# Patient Record
Sex: FEMALE | Race: WHITE | NOT HISPANIC OR LATINO | URBAN - METROPOLITAN AREA
[De-identification: names, ages, dates, MRNs, and addresses within clinical notes are randomized per-mention and may not be internally consistent; named-entity substitution may affect disease eponyms.]

---

## 2018-02-08 ENCOUNTER — OFFICE VISIT - HEALTHEAST (OUTPATIENT)
Dept: FAMILY MEDICINE | Facility: CLINIC | Age: 73
End: 2018-02-08

## 2018-02-08 DIAGNOSIS — Z00.00 ROUTINE GENERAL MEDICAL EXAMINATION AT A HEALTH CARE FACILITY: ICD-10-CM

## 2018-02-08 DIAGNOSIS — Z23 NEED FOR PROPHYLACTIC VACCINATION AND INOCULATION AGAINST INFLUENZA: ICD-10-CM

## 2018-02-08 DIAGNOSIS — M85.80 OSTEOPENIA: ICD-10-CM

## 2018-02-08 DIAGNOSIS — I10 HYPERTENSION: ICD-10-CM

## 2018-02-08 DIAGNOSIS — E11.9 DIABETES (H): ICD-10-CM

## 2018-02-08 DIAGNOSIS — E78.5 HYPERLIPIDEMIA: ICD-10-CM

## 2018-02-08 DIAGNOSIS — Z13.220 ENCOUNTER FOR SCREENING FOR LIPOID DISORDERS: ICD-10-CM

## 2018-02-08 DIAGNOSIS — Z00.01 ENCOUNTER FOR GENERAL ADULT MEDICAL EXAMINATION WITH ABNORMAL FINDINGS: ICD-10-CM

## 2018-02-08 DIAGNOSIS — Z98.890 H/O COLONOSCOPY: ICD-10-CM

## 2018-02-08 DIAGNOSIS — K57.30 DIVERTICULA OF COLON: ICD-10-CM

## 2018-02-08 LAB
ALBUMIN SERPL-MCNC: 4.1 G/DL (ref 3.5–5)
ALP SERPL-CCNC: 106 U/L (ref 45–120)
ALT SERPL W P-5'-P-CCNC: <9 U/L (ref 0–45)
ANION GAP SERPL CALCULATED.3IONS-SCNC: 10 MMOL/L (ref 5–18)
AST SERPL W P-5'-P-CCNC: 12 U/L (ref 0–40)
BILIRUB SERPL-MCNC: 0.7 MG/DL (ref 0–1)
BUN SERPL-MCNC: 11 MG/DL (ref 8–28)
CALCIUM SERPL-MCNC: 9.3 MG/DL (ref 8.5–10.5)
CHLORIDE BLD-SCNC: 99 MMOL/L (ref 98–107)
CHOLEST SERPL-MCNC: 236 MG/DL
CO2 SERPL-SCNC: 28 MMOL/L (ref 22–31)
CREAT SERPL-MCNC: 0.73 MG/DL (ref 0.6–1.1)
CREAT UR-MCNC: 38.8 MG/DL
FASTING STATUS PATIENT QL REPORTED: YES
GFR SERPL CREATININE-BSD FRML MDRD: >60 ML/MIN/1.73M2
GLUCOSE BLD-MCNC: 281 MG/DL (ref 70–125)
HBA1C MFR BLD: 13.1 % (ref 3.5–6)
HDLC SERPL-MCNC: 47 MG/DL
LDLC SERPL CALC-MCNC: 151 MG/DL
MICROALBUMIN UR-MCNC: 3.23 MG/DL (ref 0–1.99)
MICROALBUMIN/CREAT UR: 83.2 MG/G
POTASSIUM BLD-SCNC: 3.6 MMOL/L (ref 3.5–5)
PROT SERPL-MCNC: 8 G/DL (ref 6–8)
SODIUM SERPL-SCNC: 137 MMOL/L (ref 136–145)
TRIGL SERPL-MCNC: 191 MG/DL

## 2018-02-08 RX ORDER — ASPIRIN 81 MG/1
81 TABLET, CHEWABLE ORAL DAILY
Status: SHIPPED | COMMUNITY
Start: 2018-02-08

## 2018-02-08 ASSESSMENT — MIFFLIN-ST. JEOR: SCORE: 1181.12

## 2018-02-27 ENCOUNTER — OFFICE VISIT - HEALTHEAST (OUTPATIENT)
Dept: FAMILY MEDICINE | Facility: CLINIC | Age: 73
End: 2018-02-27

## 2018-02-27 DIAGNOSIS — E78.5 HYPERLIPIDEMIA: ICD-10-CM

## 2018-02-27 DIAGNOSIS — L21.9 SEBORRHEIC DERMATITIS: ICD-10-CM

## 2018-02-27 DIAGNOSIS — I10 HYPERTENSION: ICD-10-CM

## 2018-02-27 DIAGNOSIS — E11.9 DIABETES (H): ICD-10-CM

## 2018-02-27 DIAGNOSIS — R80.9 MICROALBUMINURIA: ICD-10-CM

## 2018-03-14 ENCOUNTER — OFFICE VISIT - HEALTHEAST (OUTPATIENT)
Dept: NURSING | Facility: CLINIC | Age: 73
End: 2018-03-14

## 2018-03-14 DIAGNOSIS — I10 HYPERTENSION: ICD-10-CM

## 2018-03-14 DIAGNOSIS — E11.8 TYPE 2 DIABETES MELLITUS WITH COMPLICATION, WITHOUT LONG-TERM CURRENT USE OF INSULIN (H): ICD-10-CM

## 2018-03-14 DIAGNOSIS — E78.5 HYPERLIPIDEMIA: ICD-10-CM

## 2018-04-13 ENCOUNTER — OFFICE VISIT - HEALTHEAST (OUTPATIENT)
Dept: NURSING | Facility: CLINIC | Age: 73
End: 2018-04-13

## 2018-04-13 ENCOUNTER — COMMUNICATION - HEALTHEAST (OUTPATIENT)
Dept: NURSING | Facility: CLINIC | Age: 73
End: 2018-04-13

## 2018-04-13 DIAGNOSIS — I10 HYPERTENSION: ICD-10-CM

## 2018-04-13 DIAGNOSIS — E11.8 TYPE 2 DIABETES MELLITUS WITH COMPLICATION, WITHOUT LONG-TERM CURRENT USE OF INSULIN (H): ICD-10-CM

## 2018-04-13 LAB
CREAT SERPL-MCNC: 0.68 MG/DL (ref 0.6–1.1)
GFR SERPL CREATININE-BSD FRML MDRD: >60 ML/MIN/1.73M2
POTASSIUM BLD-SCNC: 3.9 MMOL/L (ref 3.5–5)

## 2018-05-10 ENCOUNTER — OFFICE VISIT - HEALTHEAST (OUTPATIENT)
Dept: FAMILY MEDICINE | Facility: CLINIC | Age: 73
End: 2018-05-10

## 2018-05-10 DIAGNOSIS — R80.9 MICROALBUMINURIA: ICD-10-CM

## 2018-05-10 DIAGNOSIS — E78.5 HYPERLIPIDEMIA: ICD-10-CM

## 2018-05-10 DIAGNOSIS — E11.9 DIABETES MELLITUS (H): ICD-10-CM

## 2018-05-10 DIAGNOSIS — L21.9 SEBORRHEIC DERMATITIS OF SCALP: ICD-10-CM

## 2018-05-10 DIAGNOSIS — I10 HYPERTENSION: ICD-10-CM

## 2018-05-10 LAB — HBA1C MFR BLD: 9.3 % (ref 3.5–6)

## 2018-05-10 ASSESSMENT — MIFFLIN-ST. JEOR: SCORE: 1223.64

## 2018-05-11 ENCOUNTER — OFFICE VISIT - HEALTHEAST (OUTPATIENT)
Dept: EDUCATION SERVICES | Facility: CLINIC | Age: 73
End: 2018-05-11

## 2018-05-11 DIAGNOSIS — E11.8 TYPE 2 DIABETES MELLITUS WITH COMPLICATION, WITHOUT LONG-TERM CURRENT USE OF INSULIN (H): ICD-10-CM

## 2018-05-14 ENCOUNTER — COMMUNICATION - HEALTHEAST (OUTPATIENT)
Dept: FAMILY MEDICINE | Facility: CLINIC | Age: 73
End: 2018-05-14

## 2018-05-15 ENCOUNTER — RECORDS - HEALTHEAST (OUTPATIENT)
Dept: ADMINISTRATIVE | Facility: OTHER | Age: 73
End: 2018-05-15

## 2018-06-05 ENCOUNTER — COMMUNICATION - HEALTHEAST (OUTPATIENT)
Dept: FAMILY MEDICINE | Facility: CLINIC | Age: 73
End: 2018-06-05

## 2018-06-05 DIAGNOSIS — L21.9 SEBORRHEIC DERMATITIS OF SCALP: ICD-10-CM

## 2018-07-19 ENCOUNTER — OFFICE VISIT - HEALTHEAST (OUTPATIENT)
Dept: EDUCATION SERVICES | Facility: CLINIC | Age: 73
End: 2018-07-19

## 2018-07-19 DIAGNOSIS — E08.65 DIABETES MELLITUS DUE TO UNDERLYING CONDITION WITH HYPERGLYCEMIA (H): ICD-10-CM

## 2018-07-19 LAB — HBA1C MFR BLD: 7.5 % (ref 3.5–6)

## 2018-08-21 ENCOUNTER — OFFICE VISIT - HEALTHEAST (OUTPATIENT)
Dept: FAMILY MEDICINE | Facility: CLINIC | Age: 73
End: 2018-08-21

## 2018-08-21 DIAGNOSIS — I10 ESSENTIAL HYPERTENSION: ICD-10-CM

## 2018-08-21 DIAGNOSIS — R80.9 MICROALBUMINURIA: ICD-10-CM

## 2018-08-21 DIAGNOSIS — E78.5 HYPERLIPIDEMIA: ICD-10-CM

## 2018-08-21 DIAGNOSIS — E11.29 TYPE 2 DIABETES MELLITUS WITH MICROALBUMINURIA, WITHOUT LONG-TERM CURRENT USE OF INSULIN (H): ICD-10-CM

## 2018-08-21 DIAGNOSIS — R80.9 TYPE 2 DIABETES MELLITUS WITH MICROALBUMINURIA, WITHOUT LONG-TERM CURRENT USE OF INSULIN (H): ICD-10-CM

## 2018-08-21 LAB
ALT SERPL W P-5'-P-CCNC: <9 U/L (ref 0–45)
AST SERPL W P-5'-P-CCNC: 12 U/L (ref 0–40)
CHOLEST SERPL-MCNC: 117 MG/DL
FASTING STATUS PATIENT QL REPORTED: YES
HBA1C MFR BLD: 7.5 % (ref 3.5–6)
HDLC SERPL-MCNC: 38 MG/DL
LDLC SERPL CALC-MCNC: 55 MG/DL
TRIGL SERPL-MCNC: 118 MG/DL

## 2018-08-22 ENCOUNTER — COMMUNICATION - HEALTHEAST (OUTPATIENT)
Dept: FAMILY MEDICINE | Facility: CLINIC | Age: 73
End: 2018-08-22

## 2018-11-15 ENCOUNTER — OFFICE VISIT - HEALTHEAST (OUTPATIENT)
Dept: FAMILY MEDICINE | Facility: CLINIC | Age: 73
End: 2018-11-15

## 2018-11-15 DIAGNOSIS — R80.9 TYPE 2 DIABETES MELLITUS WITH MICROALBUMINURIA, WITHOUT LONG-TERM CURRENT USE OF INSULIN (H): ICD-10-CM

## 2018-11-15 DIAGNOSIS — Z23 FLU VACCINE NEED: ICD-10-CM

## 2018-11-15 DIAGNOSIS — I10 ESSENTIAL HYPERTENSION: ICD-10-CM

## 2018-11-15 DIAGNOSIS — E78.5 HYPERLIPIDEMIA, UNSPECIFIED HYPERLIPIDEMIA TYPE: ICD-10-CM

## 2018-11-15 DIAGNOSIS — E11.29 TYPE 2 DIABETES MELLITUS WITH MICROALBUMINURIA, WITHOUT LONG-TERM CURRENT USE OF INSULIN (H): ICD-10-CM

## 2018-11-15 DIAGNOSIS — R80.9 MICROALBUMINURIA: ICD-10-CM

## 2018-11-15 DIAGNOSIS — E11.8 TYPE 2 DIABETES MELLITUS WITH COMPLICATION, WITHOUT LONG-TERM CURRENT USE OF INSULIN (H): ICD-10-CM

## 2018-11-15 LAB — HBA1C MFR BLD: 9.3 % (ref 3.5–6)

## 2018-11-24 ENCOUNTER — COMMUNICATION - HEALTHEAST (OUTPATIENT)
Dept: FAMILY MEDICINE | Facility: CLINIC | Age: 73
End: 2018-11-24

## 2018-11-24 DIAGNOSIS — E78.5 HYPERLIPIDEMIA: ICD-10-CM

## 2018-11-24 DIAGNOSIS — E11.8 TYPE 2 DIABETES MELLITUS WITH COMPLICATION, WITHOUT LONG-TERM CURRENT USE OF INSULIN (H): ICD-10-CM

## 2018-11-27 ENCOUNTER — COMMUNICATION - HEALTHEAST (OUTPATIENT)
Dept: FAMILY MEDICINE | Facility: CLINIC | Age: 73
End: 2018-11-27

## 2018-11-27 DIAGNOSIS — E78.5 HYPERLIPIDEMIA: ICD-10-CM

## 2018-11-27 DIAGNOSIS — E11.8 TYPE 2 DIABETES MELLITUS WITH COMPLICATION, WITHOUT LONG-TERM CURRENT USE OF INSULIN (H): ICD-10-CM

## 2019-02-14 ENCOUNTER — OFFICE VISIT - HEALTHEAST (OUTPATIENT)
Dept: FAMILY MEDICINE | Facility: CLINIC | Age: 74
End: 2019-02-14

## 2019-02-14 DIAGNOSIS — Z71.85 IMMUNIZATION COUNSELING: ICD-10-CM

## 2019-02-14 DIAGNOSIS — E11.29 TYPE 2 DIABETES MELLITUS WITH MICROALBUMINURIA, WITHOUT LONG-TERM CURRENT USE OF INSULIN (H): ICD-10-CM

## 2019-02-14 DIAGNOSIS — I10 ESSENTIAL HYPERTENSION: ICD-10-CM

## 2019-02-14 DIAGNOSIS — R80.9 MICROALBUMINURIA: ICD-10-CM

## 2019-02-14 DIAGNOSIS — E78.5 HYPERLIPIDEMIA, UNSPECIFIED HYPERLIPIDEMIA TYPE: ICD-10-CM

## 2019-02-14 DIAGNOSIS — R80.9 TYPE 2 DIABETES MELLITUS WITH MICROALBUMINURIA, WITHOUT LONG-TERM CURRENT USE OF INSULIN (H): ICD-10-CM

## 2019-02-14 LAB
ALBUMIN SERPL-MCNC: 4.1 G/DL (ref 3.5–5)
ALP SERPL-CCNC: 66 U/L (ref 45–120)
ALT SERPL W P-5'-P-CCNC: 9 U/L (ref 0–45)
ANION GAP SERPL CALCULATED.3IONS-SCNC: 10 MMOL/L (ref 5–18)
AST SERPL W P-5'-P-CCNC: 14 U/L (ref 0–40)
BILIRUB SERPL-MCNC: 0.7 MG/DL (ref 0–1)
BUN SERPL-MCNC: 7 MG/DL (ref 8–28)
CALCIUM SERPL-MCNC: 9.1 MG/DL (ref 8.5–10.5)
CHLORIDE BLD-SCNC: 104 MMOL/L (ref 98–107)
CO2 SERPL-SCNC: 28 MMOL/L (ref 22–31)
CREAT SERPL-MCNC: 0.75 MG/DL (ref 0.6–1.1)
GFR SERPL CREATININE-BSD FRML MDRD: >60 ML/MIN/1.73M2
GLUCOSE BLD-MCNC: 107 MG/DL (ref 70–125)
HBA1C MFR BLD: 8.6 % (ref 3.5–6)
POTASSIUM BLD-SCNC: 3.4 MMOL/L (ref 3.5–5)
PROT SERPL-MCNC: 6.9 G/DL (ref 6–8)
SODIUM SERPL-SCNC: 142 MMOL/L (ref 136–145)

## 2019-02-15 ENCOUNTER — COMMUNICATION - HEALTHEAST (OUTPATIENT)
Dept: FAMILY MEDICINE | Facility: CLINIC | Age: 74
End: 2019-02-15

## 2019-05-16 ENCOUNTER — OFFICE VISIT - HEALTHEAST (OUTPATIENT)
Dept: FAMILY MEDICINE | Facility: CLINIC | Age: 74
End: 2019-05-16

## 2019-05-16 DIAGNOSIS — E11.29 TYPE 2 DIABETES MELLITUS WITH MICROALBUMINURIA, WITHOUT LONG-TERM CURRENT USE OF INSULIN (H): ICD-10-CM

## 2019-05-16 DIAGNOSIS — R80.9 MICROALBUMINURIA: ICD-10-CM

## 2019-05-16 DIAGNOSIS — E78.5 HYPERLIPIDEMIA, UNSPECIFIED HYPERLIPIDEMIA TYPE: ICD-10-CM

## 2019-05-16 DIAGNOSIS — R11.0 NAUSEA: ICD-10-CM

## 2019-05-16 DIAGNOSIS — I10 ESSENTIAL HYPERTENSION: ICD-10-CM

## 2019-05-16 DIAGNOSIS — R80.9 TYPE 2 DIABETES MELLITUS WITH MICROALBUMINURIA, WITHOUT LONG-TERM CURRENT USE OF INSULIN (H): ICD-10-CM

## 2019-05-16 LAB
BASOPHILS # BLD AUTO: 0 THOU/UL (ref 0–0.2)
BASOPHILS NFR BLD AUTO: 1 % (ref 0–2)
EOSINOPHIL # BLD AUTO: 0.1 THOU/UL (ref 0–0.4)
EOSINOPHIL NFR BLD AUTO: 2 % (ref 0–6)
ERYTHROCYTE [DISTWIDTH] IN BLOOD BY AUTOMATED COUNT: 11.3 % (ref 11–14.5)
HBA1C MFR BLD: 9.5 % (ref 3.5–6)
HCT VFR BLD AUTO: 42.9 % (ref 35–47)
HGB BLD-MCNC: 14.8 G/DL (ref 12–16)
LYMPHOCYTES # BLD AUTO: 2.5 THOU/UL (ref 0.8–4.4)
LYMPHOCYTES NFR BLD AUTO: 36 % (ref 20–40)
MCH RBC QN AUTO: 30.5 PG (ref 27–34)
MCHC RBC AUTO-ENTMCNC: 34.4 G/DL (ref 32–36)
MCV RBC AUTO: 89 FL (ref 80–100)
MONOCYTES # BLD AUTO: 0.5 THOU/UL (ref 0–0.9)
MONOCYTES NFR BLD AUTO: 7 % (ref 2–10)
NEUTROPHILS # BLD AUTO: 3.8 THOU/UL (ref 2–7.7)
NEUTROPHILS NFR BLD AUTO: 54 % (ref 50–70)
PLATELET # BLD AUTO: 171 THOU/UL (ref 140–440)
PMV BLD AUTO: 7.7 FL (ref 7–10)
RBC # BLD AUTO: 4.84 MILL/UL (ref 3.8–5.4)
WBC: 7 THOU/UL (ref 4–11)

## 2019-05-20 ENCOUNTER — COMMUNICATION - HEALTHEAST (OUTPATIENT)
Dept: FAMILY MEDICINE | Facility: CLINIC | Age: 74
End: 2019-05-20

## 2019-05-23 ENCOUNTER — COMMUNICATION - HEALTHEAST (OUTPATIENT)
Dept: FAMILY MEDICINE | Facility: CLINIC | Age: 74
End: 2019-05-23

## 2019-05-23 DIAGNOSIS — E11.8 TYPE 2 DIABETES MELLITUS WITH COMPLICATION, WITHOUT LONG-TERM CURRENT USE OF INSULIN (H): ICD-10-CM

## 2019-08-17 ENCOUNTER — COMMUNICATION - HEALTHEAST (OUTPATIENT)
Dept: FAMILY MEDICINE | Facility: CLINIC | Age: 74
End: 2019-08-17

## 2019-08-17 DIAGNOSIS — E11.8 TYPE 2 DIABETES MELLITUS WITH COMPLICATION, WITHOUT LONG-TERM CURRENT USE OF INSULIN (H): ICD-10-CM

## 2019-11-12 ENCOUNTER — OFFICE VISIT - HEALTHEAST (OUTPATIENT)
Dept: FAMILY MEDICINE | Facility: CLINIC | Age: 74
End: 2019-11-12

## 2019-11-12 DIAGNOSIS — R80.9 MICROALBUMINURIA: ICD-10-CM

## 2019-11-12 DIAGNOSIS — E78.5 HYPERLIPIDEMIA: ICD-10-CM

## 2019-11-12 DIAGNOSIS — I10 ESSENTIAL HYPERTENSION: ICD-10-CM

## 2019-11-12 DIAGNOSIS — Z23 NEED FOR PROPHYLACTIC VACCINATION AGAINST STREPTOCOCCUS PNEUMONIAE (PNEUMOCOCCUS): ICD-10-CM

## 2019-11-12 DIAGNOSIS — Z23 NEEDS FLU SHOT: ICD-10-CM

## 2019-11-12 DIAGNOSIS — E11.8 TYPE 2 DIABETES MELLITUS WITH COMPLICATION, WITHOUT LONG-TERM CURRENT USE OF INSULIN (H): ICD-10-CM

## 2019-11-12 LAB
CHOLEST SERPL-MCNC: 175 MG/DL
CREAT UR-MCNC: 124.8 MG/DL
FASTING STATUS PATIENT QL REPORTED: YES
HBA1C MFR BLD: 10.5 % (ref 3.5–6)
HDLC SERPL-MCNC: 48 MG/DL
LDLC SERPL CALC-MCNC: 90 MG/DL
MICROALBUMIN UR-MCNC: 4.82 MG/DL (ref 0–1.99)
MICROALBUMIN/CREAT UR: 38.6 MG/G
TRIGL SERPL-MCNC: 186 MG/DL

## 2019-11-12 ASSESSMENT — MIFFLIN-ST. JEOR: SCORE: 1257.66

## 2019-11-14 ENCOUNTER — AMBULATORY - HEALTHEAST (OUTPATIENT)
Dept: FAMILY MEDICINE | Facility: CLINIC | Age: 74
End: 2019-11-14

## 2019-11-14 DIAGNOSIS — E11.8 TYPE 2 DIABETES MELLITUS WITH COMPLICATION, WITHOUT LONG-TERM CURRENT USE OF INSULIN (H): ICD-10-CM

## 2019-11-15 ENCOUNTER — COMMUNICATION - HEALTHEAST (OUTPATIENT)
Dept: FAMILY MEDICINE | Facility: CLINIC | Age: 74
End: 2019-11-15

## 2019-11-15 DIAGNOSIS — E11.8 TYPE 2 DIABETES MELLITUS WITH COMPLICATION, WITHOUT LONG-TERM CURRENT USE OF INSULIN (H): ICD-10-CM

## 2019-12-30 ENCOUNTER — AMBULATORY - HEALTHEAST (OUTPATIENT)
Dept: FAMILY MEDICINE | Facility: CLINIC | Age: 74
End: 2019-12-30

## 2019-12-30 ENCOUNTER — OFFICE VISIT - HEALTHEAST (OUTPATIENT)
Dept: FAMILY MEDICINE | Facility: CLINIC | Age: 74
End: 2019-12-30

## 2019-12-30 DIAGNOSIS — E11.8 TYPE 2 DIABETES MELLITUS WITH COMPLICATION, WITHOUT LONG-TERM CURRENT USE OF INSULIN (H): ICD-10-CM

## 2019-12-30 DIAGNOSIS — E78.5 HYPERLIPIDEMIA, UNSPECIFIED HYPERLIPIDEMIA TYPE: ICD-10-CM

## 2019-12-30 DIAGNOSIS — I10 ESSENTIAL HYPERTENSION: ICD-10-CM

## 2019-12-30 DIAGNOSIS — R80.9 MICROALBUMINURIA: ICD-10-CM

## 2019-12-30 LAB — HBA1C MFR BLD: 10 % (ref 3.5–6)

## 2020-01-04 ENCOUNTER — COMMUNICATION - HEALTHEAST (OUTPATIENT)
Dept: FAMILY MEDICINE | Facility: CLINIC | Age: 75
End: 2020-01-04

## 2020-01-04 DIAGNOSIS — E11.8 TYPE 2 DIABETES MELLITUS WITH COMPLICATION, WITHOUT LONG-TERM CURRENT USE OF INSULIN (H): ICD-10-CM

## 2020-02-17 ENCOUNTER — OFFICE VISIT - HEALTHEAST (OUTPATIENT)
Dept: FAMILY MEDICINE | Facility: CLINIC | Age: 75
End: 2020-02-17

## 2020-02-17 DIAGNOSIS — E78.5 HYPERLIPIDEMIA, UNSPECIFIED HYPERLIPIDEMIA TYPE: ICD-10-CM

## 2020-02-17 DIAGNOSIS — I10 ESSENTIAL HYPERTENSION: ICD-10-CM

## 2020-02-17 DIAGNOSIS — E11.8 TYPE 2 DIABETES MELLITUS WITH COMPLICATION, WITHOUT LONG-TERM CURRENT USE OF INSULIN (H): ICD-10-CM

## 2020-02-17 DIAGNOSIS — R80.9 MICROALBUMINURIA: ICD-10-CM

## 2020-02-17 LAB — HBA1C MFR BLD: 8.7 % (ref 3.5–6)

## 2020-02-18 ENCOUNTER — COMMUNICATION - HEALTHEAST (OUTPATIENT)
Dept: FAMILY MEDICINE | Facility: CLINIC | Age: 75
End: 2020-02-18

## 2020-03-27 ENCOUNTER — COMMUNICATION - HEALTHEAST (OUTPATIENT)
Dept: FAMILY MEDICINE | Facility: CLINIC | Age: 75
End: 2020-03-27

## 2020-03-27 DIAGNOSIS — E78.5 HYPERLIPIDEMIA: ICD-10-CM

## 2020-05-26 ENCOUNTER — OFFICE VISIT - HEALTHEAST (OUTPATIENT)
Dept: FAMILY MEDICINE | Facility: CLINIC | Age: 75
End: 2020-05-26

## 2020-05-26 DIAGNOSIS — E11.8 TYPE 2 DIABETES MELLITUS WITH COMPLICATION, WITHOUT LONG-TERM CURRENT USE OF INSULIN (H): ICD-10-CM

## 2020-05-26 DIAGNOSIS — I10 ESSENTIAL HYPERTENSION: ICD-10-CM

## 2020-05-26 DIAGNOSIS — R80.9 MICROALBUMINURIA: ICD-10-CM

## 2020-05-26 DIAGNOSIS — E78.5 HYPERLIPIDEMIA, UNSPECIFIED HYPERLIPIDEMIA TYPE: ICD-10-CM

## 2020-06-03 ENCOUNTER — COMMUNICATION - HEALTHEAST (OUTPATIENT)
Dept: FAMILY MEDICINE | Facility: CLINIC | Age: 75
End: 2020-06-03

## 2020-06-03 ENCOUNTER — AMBULATORY - HEALTHEAST (OUTPATIENT)
Dept: LAB | Facility: CLINIC | Age: 75
End: 2020-06-03

## 2020-06-03 DIAGNOSIS — E11.8 TYPE 2 DIABETES MELLITUS WITH COMPLICATION, WITHOUT LONG-TERM CURRENT USE OF INSULIN (H): ICD-10-CM

## 2020-06-03 LAB — HBA1C MFR BLD: 7.4 % (ref 3.5–6)

## 2020-06-10 ENCOUNTER — COMMUNICATION - HEALTHEAST (OUTPATIENT)
Dept: FAMILY MEDICINE | Facility: CLINIC | Age: 75
End: 2020-06-10

## 2020-06-10 DIAGNOSIS — E11.8 TYPE 2 DIABETES MELLITUS WITH COMPLICATION, WITHOUT LONG-TERM CURRENT USE OF INSULIN (H): ICD-10-CM

## 2020-07-01 ENCOUNTER — COMMUNICATION - HEALTHEAST (OUTPATIENT)
Dept: FAMILY MEDICINE | Facility: CLINIC | Age: 75
End: 2020-07-01

## 2020-07-01 DIAGNOSIS — E11.8 TYPE 2 DIABETES MELLITUS WITH COMPLICATION, WITHOUT LONG-TERM CURRENT USE OF INSULIN (H): ICD-10-CM

## 2020-07-24 ENCOUNTER — COMMUNICATION - HEALTHEAST (OUTPATIENT)
Dept: FAMILY MEDICINE | Facility: CLINIC | Age: 75
End: 2020-07-24

## 2020-07-24 DIAGNOSIS — R80.9 MICROALBUMINURIA: ICD-10-CM

## 2020-07-24 DIAGNOSIS — I10 ESSENTIAL HYPERTENSION: ICD-10-CM

## 2020-08-03 ENCOUNTER — COMMUNICATION - HEALTHEAST (OUTPATIENT)
Dept: FAMILY MEDICINE | Facility: CLINIC | Age: 75
End: 2020-08-03

## 2020-08-03 DIAGNOSIS — E78.5 HYPERLIPIDEMIA: ICD-10-CM

## 2020-08-14 ENCOUNTER — COMMUNICATION - HEALTHEAST (OUTPATIENT)
Dept: FAMILY MEDICINE | Facility: CLINIC | Age: 75
End: 2020-08-14

## 2020-08-21 ENCOUNTER — COMMUNICATION - HEALTHEAST (OUTPATIENT)
Dept: FAMILY MEDICINE | Facility: CLINIC | Age: 75
End: 2020-08-21

## 2020-08-21 DIAGNOSIS — E11.8 TYPE 2 DIABETES MELLITUS WITH COMPLICATION, WITHOUT LONG-TERM CURRENT USE OF INSULIN (H): ICD-10-CM

## 2020-08-25 ENCOUNTER — OFFICE VISIT - HEALTHEAST (OUTPATIENT)
Dept: FAMILY MEDICINE | Facility: CLINIC | Age: 75
End: 2020-08-25

## 2020-08-25 DIAGNOSIS — Z00.00 ENCOUNTER FOR MEDICARE ANNUAL WELLNESS EXAM: ICD-10-CM

## 2020-08-25 DIAGNOSIS — I10 ESSENTIAL HYPERTENSION: ICD-10-CM

## 2020-08-25 DIAGNOSIS — E78.5 HYPERLIPIDEMIA, UNSPECIFIED HYPERLIPIDEMIA TYPE: ICD-10-CM

## 2020-08-25 DIAGNOSIS — E11.8 TYPE 2 DIABETES MELLITUS WITH COMPLICATION, WITHOUT LONG-TERM CURRENT USE OF INSULIN (H): ICD-10-CM

## 2020-08-25 LAB
ALBUMIN SERPL-MCNC: 4.1 G/DL (ref 3.5–5)
ALP SERPL-CCNC: 90 U/L (ref 45–120)
ALT SERPL W P-5'-P-CCNC: <9 U/L (ref 0–45)
ANION GAP SERPL CALCULATED.3IONS-SCNC: 9 MMOL/L (ref 5–18)
AST SERPL W P-5'-P-CCNC: 11 U/L (ref 0–40)
BILIRUB SERPL-MCNC: 0.3 MG/DL (ref 0–1)
BUN SERPL-MCNC: 11 MG/DL (ref 8–28)
CALCIUM SERPL-MCNC: 9.4 MG/DL (ref 8.5–10.5)
CHLORIDE BLD-SCNC: 104 MMOL/L (ref 98–107)
CHOLEST SERPL-MCNC: 148 MG/DL
CO2 SERPL-SCNC: 26 MMOL/L (ref 22–31)
CREAT SERPL-MCNC: 0.88 MG/DL (ref 0.6–1.1)
FASTING STATUS PATIENT QL REPORTED: ABNORMAL
GFR SERPL CREATININE-BSD FRML MDRD: >60 ML/MIN/1.73M2
GLUCOSE BLD-MCNC: 238 MG/DL (ref 70–125)
HBA1C MFR BLD: 7 %
HDLC SERPL-MCNC: 42 MG/DL
LDLC SERPL CALC-MCNC: 47 MG/DL
POTASSIUM BLD-SCNC: 4.4 MMOL/L (ref 3.5–5)
PROT SERPL-MCNC: 7.3 G/DL (ref 6–8)
SODIUM SERPL-SCNC: 139 MMOL/L (ref 136–145)
TRIGL SERPL-MCNC: 297 MG/DL

## 2020-08-26 ENCOUNTER — COMMUNICATION - HEALTHEAST (OUTPATIENT)
Dept: FAMILY MEDICINE | Facility: CLINIC | Age: 75
End: 2020-08-26

## 2020-08-26 DIAGNOSIS — E11.8 TYPE 2 DIABETES MELLITUS WITH COMPLICATION, WITHOUT LONG-TERM CURRENT USE OF INSULIN (H): ICD-10-CM

## 2020-09-03 ENCOUNTER — AMBULATORY - HEALTHEAST (OUTPATIENT)
Dept: NURSING | Facility: CLINIC | Age: 75
End: 2020-09-03

## 2020-09-03 DIAGNOSIS — Z23 NEED FOR VACCINATION: ICD-10-CM

## 2020-09-08 ENCOUNTER — COMMUNICATION - HEALTHEAST (OUTPATIENT)
Dept: FAMILY MEDICINE | Facility: CLINIC | Age: 75
End: 2020-09-08

## 2020-10-05 ENCOUNTER — COMMUNICATION - HEALTHEAST (OUTPATIENT)
Dept: FAMILY MEDICINE | Facility: CLINIC | Age: 75
End: 2020-10-05

## 2020-10-05 DIAGNOSIS — I10 ESSENTIAL HYPERTENSION: ICD-10-CM

## 2020-10-05 DIAGNOSIS — R80.9 MICROALBUMINURIA: ICD-10-CM

## 2020-10-16 ENCOUNTER — COMMUNICATION - HEALTHEAST (OUTPATIENT)
Dept: FAMILY MEDICINE | Facility: CLINIC | Age: 75
End: 2020-10-16

## 2020-10-16 DIAGNOSIS — E78.5 HYPERLIPIDEMIA: ICD-10-CM

## 2020-10-22 ENCOUNTER — COMMUNICATION - HEALTHEAST (OUTPATIENT)
Dept: FAMILY MEDICINE | Facility: CLINIC | Age: 75
End: 2020-10-22

## 2020-10-22 DIAGNOSIS — E11.8 TYPE 2 DIABETES MELLITUS WITH COMPLICATION, WITHOUT LONG-TERM CURRENT USE OF INSULIN (H): ICD-10-CM

## 2020-11-06 ENCOUNTER — COMMUNICATION - HEALTHEAST (OUTPATIENT)
Dept: FAMILY MEDICINE | Facility: CLINIC | Age: 75
End: 2020-11-06

## 2020-11-06 DIAGNOSIS — E11.8 TYPE 2 DIABETES MELLITUS WITH COMPLICATION, WITHOUT LONG-TERM CURRENT USE OF INSULIN (H): ICD-10-CM

## 2020-12-14 ENCOUNTER — COMMUNICATION - HEALTHEAST (OUTPATIENT)
Dept: FAMILY MEDICINE | Facility: CLINIC | Age: 75
End: 2020-12-14

## 2020-12-14 DIAGNOSIS — R80.9 MICROALBUMINURIA: ICD-10-CM

## 2020-12-14 DIAGNOSIS — I10 ESSENTIAL HYPERTENSION: ICD-10-CM

## 2020-12-28 ENCOUNTER — COMMUNICATION - HEALTHEAST (OUTPATIENT)
Dept: FAMILY MEDICINE | Facility: CLINIC | Age: 75
End: 2020-12-28

## 2020-12-28 DIAGNOSIS — E11.8 TYPE 2 DIABETES MELLITUS WITH COMPLICATION, WITHOUT LONG-TERM CURRENT USE OF INSULIN (H): ICD-10-CM

## 2020-12-28 DIAGNOSIS — E78.5 HYPERLIPIDEMIA: ICD-10-CM

## 2020-12-30 RX ORDER — METFORMIN HCL 500 MG
TABLET, EXTENDED RELEASE 24 HR ORAL
Qty: 360 TABLET | Refills: 0 | Status: SHIPPED | OUTPATIENT
Start: 2020-12-30

## 2021-01-06 ENCOUNTER — OFFICE VISIT - HEALTHEAST (OUTPATIENT)
Dept: FAMILY MEDICINE | Facility: CLINIC | Age: 76
End: 2021-01-06

## 2021-01-06 DIAGNOSIS — Z71.85 IMMUNIZATION COUNSELING: ICD-10-CM

## 2021-01-06 DIAGNOSIS — E78.5 HYPERLIPIDEMIA, UNSPECIFIED HYPERLIPIDEMIA TYPE: ICD-10-CM

## 2021-01-06 DIAGNOSIS — E11.8 TYPE 2 DIABETES MELLITUS WITH COMPLICATION, WITHOUT LONG-TERM CURRENT USE OF INSULIN (H): ICD-10-CM

## 2021-01-06 DIAGNOSIS — R80.9 MICROALBUMINURIA: ICD-10-CM

## 2021-01-06 DIAGNOSIS — I10 ESSENTIAL HYPERTENSION: ICD-10-CM

## 2021-01-07 ENCOUNTER — COMMUNICATION - HEALTHEAST (OUTPATIENT)
Dept: FAMILY MEDICINE | Facility: CLINIC | Age: 76
End: 2021-01-07

## 2021-01-07 DIAGNOSIS — E11.8 TYPE 2 DIABETES MELLITUS WITH COMPLICATION, WITHOUT LONG-TERM CURRENT USE OF INSULIN (H): ICD-10-CM

## 2021-01-08 RX ORDER — BLOOD SUGAR DIAGNOSTIC
STRIP MISCELLANEOUS
Qty: 100 STRIP | Refills: 3 | Status: SHIPPED | OUTPATIENT
Start: 2021-01-08

## 2021-01-11 ENCOUNTER — COMMUNICATION - HEALTHEAST (OUTPATIENT)
Dept: FAMILY MEDICINE | Facility: CLINIC | Age: 76
End: 2021-01-11

## 2021-01-13 ENCOUNTER — COMMUNICATION - HEALTHEAST (OUTPATIENT)
Dept: FAMILY MEDICINE | Facility: CLINIC | Age: 76
End: 2021-01-13

## 2021-01-13 DIAGNOSIS — E11.8 TYPE 2 DIABETES MELLITUS WITH COMPLICATION, WITHOUT LONG-TERM CURRENT USE OF INSULIN (H): ICD-10-CM

## 2021-01-15 ENCOUNTER — COMMUNICATION - HEALTHEAST (OUTPATIENT)
Dept: FAMILY MEDICINE | Facility: CLINIC | Age: 76
End: 2021-01-15

## 2021-01-15 DIAGNOSIS — E11.8 TYPE 2 DIABETES MELLITUS WITH COMPLICATION, WITHOUT LONG-TERM CURRENT USE OF INSULIN (H): ICD-10-CM

## 2021-01-15 RX ORDER — CALCIUM CITRATE/VITAMIN D3 200MG-6.25
1 TABLET ORAL PRN
Status: SHIPPED | COMMUNITY
Start: 2021-01-15

## 2021-01-15 RX ORDER — GLUCOSAM/CHON-MSM1/C/MANG/BOSW 500-416.6
TABLET ORAL
Status: SHIPPED | COMMUNITY
Start: 2021-01-11

## 2021-01-15 RX ORDER — BLOOD-GLUCOSE METER
EACH MISCELLANEOUS
Status: SHIPPED | COMMUNITY
Start: 2021-01-15

## 2021-01-21 ENCOUNTER — COMMUNICATION - HEALTHEAST (OUTPATIENT)
Dept: FAMILY MEDICINE | Facility: CLINIC | Age: 76
End: 2021-01-21

## 2021-02-17 ENCOUNTER — COMMUNICATION - HEALTHEAST (OUTPATIENT)
Dept: FAMILY MEDICINE | Facility: CLINIC | Age: 76
End: 2021-02-17

## 2021-02-22 ENCOUNTER — COMMUNICATION - HEALTHEAST (OUTPATIENT)
Dept: FAMILY MEDICINE | Facility: CLINIC | Age: 76
End: 2021-02-22

## 2021-02-22 DIAGNOSIS — R80.9 MICROALBUMINURIA: ICD-10-CM

## 2021-02-22 DIAGNOSIS — I10 ESSENTIAL HYPERTENSION: ICD-10-CM

## 2021-02-23 RX ORDER — LISINOPRIL 20 MG/1
TABLET ORAL
Qty: 90 TABLET | Refills: 1 | Status: SHIPPED | OUTPATIENT
Start: 2021-02-23

## 2021-03-12 ENCOUNTER — COMMUNICATION - HEALTHEAST (OUTPATIENT)
Dept: FAMILY MEDICINE | Facility: CLINIC | Age: 76
End: 2021-03-12

## 2021-03-12 DIAGNOSIS — E11.8 TYPE 2 DIABETES MELLITUS WITH COMPLICATION, WITHOUT LONG-TERM CURRENT USE OF INSULIN (H): ICD-10-CM

## 2021-04-02 ENCOUNTER — COMMUNICATION - HEALTHEAST (OUTPATIENT)
Dept: FAMILY MEDICINE | Facility: CLINIC | Age: 76
End: 2021-04-02

## 2021-04-02 DIAGNOSIS — E78.5 HYPERLIPIDEMIA: ICD-10-CM

## 2021-04-04 RX ORDER — ATORVASTATIN CALCIUM 40 MG/1
TABLET, FILM COATED ORAL
Qty: 90 TABLET | Refills: 3 | Status: SHIPPED | OUTPATIENT
Start: 2021-04-04 | End: 2022-03-25

## 2021-05-20 ENCOUNTER — RECORDS - HEALTHEAST (OUTPATIENT)
Dept: FAMILY MEDICINE | Facility: CLINIC | Age: 76
End: 2021-05-20

## 2021-05-20 DIAGNOSIS — E11.8 TYPE 2 DIABETES MELLITUS WITH COMPLICATION, WITHOUT LONG-TERM CURRENT USE OF INSULIN (H): ICD-10-CM

## 2021-05-20 RX ORDER — PIOGLITAZONEHYDROCHLORIDE 45 MG/1
TABLET ORAL
Qty: 30 TABLET | Refills: 0 | Status: SHIPPED | OUTPATIENT
Start: 2021-05-20

## 2021-05-20 RX ORDER — GLIPIZIDE 5 MG/1
TABLET, FILM COATED, EXTENDED RELEASE ORAL
Qty: 180 TABLET | Refills: 0 | Status: SHIPPED | OUTPATIENT
Start: 2021-05-20

## 2021-05-28 NOTE — TELEPHONE ENCOUNTER
----- Message from Lukas Lee MD sent at 5/19/2019 12:29 PM CDT -----  Please contact this patient, let her know that the A1c is worse and it is up to 9.5    She needs to restart the Metformin and also I put her on a medication called Januvia.  I sent it to her [facility] drug delivery pharmacy.    She should take the generic Januvia once a day in addition to the Metformin and the glipizide.    Recheck in 6 weeks    Call or stop and if any ongoing nausea

## 2021-05-28 NOTE — TELEPHONE ENCOUNTER
Called pt and relayed provider's message. She requested that a letter be sent out with the instructions. Per pt's request, letter has been drafted and will be sent out for mailing.

## 2021-05-28 NOTE — PROGRESS NOTES
Subjective: This patient comes in for follow-up her last A1c was 8.6 prior to that 9.3.  She continues on glipizide 5 mg 2 twice a day as well as metformin 500 mg 2 twice a day.  She feels like that metformin might be causing some symptoms with her stomach she is had some nausea and headache.  I am not sure if it is a viral or from the medication    She will stay off the metformin for 3 to 4 days then restart.    Please see below I did check a CBC with differential as well as A1c    She is on atorvastatin daily and her LDL was 55 back in August we will recheck that down the line    Has not been checking her blood sugars.    Please see below A1c is up to 9.5.  She did have normal renal function in February were going to add Januvia 100 mg daily to her medications    Tobacco status: She  reports that she has never smoked. She has never used smokeless tobacco.    Patient Active Problem List    Diagnosis Date Noted     Microalbuminuria 05/10/2018     Diabetes mellitus (H) 02/08/2018     Hypertension 02/08/2018     Hyperlipidemia 02/08/2018     Diverticula of colon 02/08/2018     H/O colonoscopy 02/08/2018       Current Outpatient Medications   Medication Sig Dispense Refill     aspirin 81 mg chewable tablet Chew 81 mg daily.        atorvastatin (LIPITOR) 40 MG tablet Take 1 tablet (40 mg total) by mouth daily. 90 tablet 1     CINNAMON BARK (CINNAMON ORAL) Take 1 capsule by mouth daily as needed.       fluocinolone (SYNALAR) 0.01 % external solution Rub into affected scalp 3 times a week 60 mL 2     glipiZIDE (GLUCOTROL XL) 5 MG 24 hr tablet 2 po two times a day with meals. 360 tablet 1     lisinopril (PRINIVIL,ZESTRIL) 10 MG tablet Take 1 tablet (10 mg total) by mouth daily. 30 tablet 11     metFORMIN (GLUCOPHAGE-XR) 500 MG 24 hr tablet TAKE 2 TABLETS TWICE DAILY. 360 tablet 1     CALCIUM CARBONATE/VITAMIN D3 (CALCIUM 500 + D, D3, ORAL) Take 1 tablet by mouth daily as needed.       No current facility-administered  medications for this visit.        ROS: 10 point review of systems positive as outlined above otherwise negative    Objective:    /78 (Patient Site: Left Arm, Patient Position: Sitting, Cuff Size: Adult Large)   Pulse 96   Resp 20   Wt 164 lb 12 oz (74.7 kg)   BMI 27.00 kg/m    Body mass index is 27 kg/m .      General appearance no acute distress    HEENT neck negative no adenopathy oropharynx is clear    Lungs are clear no rales or rhonchi heart was regular S1-S2    Abdomen is soft nontender no guarding rebound, no epigastric pain    Lower extremities without edema pulses normal sensation normal    No joint redness warmth or swelling.    A1c up to 9.5    CBC with white count 7000 normal differential    Results for orders placed or performed in visit on 05/16/19   Glycosylated Hemoglobin A1c   Result Value Ref Range    Hemoglobin A1c 9.5 (H) 3.5 - 6.0 %   HM1 (CBC with Diff)   Result Value Ref Range    WBC 7.0 4.0 - 11.0 thou/uL    RBC 4.84 3.80 - 5.40 mill/uL    Hemoglobin 14.8 12.0 - 16.0 g/dL    Hematocrit 42.9 35.0 - 47.0 %    MCV 89 80 - 100 fL    MCH 30.5 27.0 - 34.0 pg    MCHC 34.4 32.0 - 36.0 g/dL    RDW 11.3 11.0 - 14.5 %    Platelets 171 140 - 440 thou/uL    MPV 7.7 7.0 - 10.0 fL    Neutrophils % 54 50 - 70 %    Lymphocytes % 36 20 - 40 %    Monocytes % 7 2 - 10 %    Eosinophils % 2 0 - 6 %    Basophils % 1 0 - 2 %    Neutrophils Absolute 3.8 2.0 - 7.7 thou/uL    Lymphocytes Absolute 2.5 0.8 - 4.4 thou/uL    Monocytes Absolute 0.5 0.0 - 0.9 thou/uL    Eosinophils Absolute 0.1 0.0 - 0.4 thou/uL    Basophils Absolute 0.0 0.0 - 0.2 thou/uL       Assessment:  1. Type 2 diabetes mellitus with microalbuminuria, without long-term current use of insulin (H)  Glycosylated Hemoglobin A1c   2. Essential hypertension     3. Hyperlipidemia, unspecified hyperlipidemia type     4. Microalbuminuria     5. Nausea  HM1(CBC and Differential)    HM1 (CBC with Diff)     Diabetes mellitus please see above regarding  holding metformin for a few days then restarting it stand glipizide add Januvia 100 mg 1 a day recheck in 6 weeks    Continue same lisinopril and atorvastatin for her hypertension and lipids and microalbumin urea    If nausea symptoms persist follow-up  Plan: As outlined above    This transcription uses voice recognition software, which may contain typographical errors.

## 2021-05-29 NOTE — TELEPHONE ENCOUNTER
RN cannot approve Refill Request    RN can NOT refill this medication Protocol failed and NO refill given.       Sravanthi Man, [facility] Triage/Med Refill 5/24/2019    Requested Prescriptions   Pending Prescriptions Disp Refills     glipiZIDE (GLUCOTROL XL) 5 MG 24 hr tablet [Pharmacy Med Name: GLIPIZIDE ER 5 MG Tablet Extended Release 24 Hour] 360 tablet 1     Sig: TAKE 2 TABLETS TWICE DAILY WITH MEALS       Oral Hypoglycemics Refill Protocol Failed - 5/23/2019 10:41 AM        Failed - Microalbumin in last year      Microalbumin, Random Urine   Date Value Ref Range Status   02/08/2018 3.23 (H) 0.00 - 1.99 mg/dL Final                  Passed - Visit with PCP or prescribing provider visit in last 6 months       Last office visit with prescriber/PCP: 5/16/2019 OR same dept: 5/16/2019 Lukas Lee MD OR same specialty: 5/16/2019 Lukas Lee MD Last physical: Visit date not found Last MTM visit: Visit date not found         Next appt within 3 mo: Visit date not found  Next physical within 3 mo: Visit date not found  Prescriber OR PCP: Lukas Lee MD  Last diagnosis associated with med order: 1. Type 2 diabetes mellitus with complication, without long-term current use of insulin (H)  - glipiZIDE (GLUCOTROL XL) 5 MG 24 hr tablet [Pharmacy Med Name: GLIPIZIDE ER 5 MG Tablet Extended Release 24 Hour]; TAKE 2 TABLETS TWICE DAILY WITH MEALS  Dispense: 360 tablet; Refill: 1     If protocol passes may refill for 12 months if within 3 months of last provider visit (or a total of 15 months).           Passed - A1C in last 6 months     Hemoglobin A1c   Date Value Ref Range Status   05/16/2019 9.5 (H) 3.5 - 6.0 % Final               Passed - Blood pressure in last year     BP Readings from Last 1 Encounters:   05/16/19 130/78             Passed - Serum creatinine in last year     Creatinine   Date Value Ref Range Status   02/14/2019 0.75 0.60 - 1.10 mg/dL Final

## 2021-05-31 VITALS — WEIGHT: 160.25 LBS | BODY MASS INDEX: 27.29 KG/M2

## 2021-05-31 VITALS — WEIGHT: 157 LBS | BODY MASS INDEX: 26.8 KG/M2 | HEIGHT: 64 IN

## 2021-05-31 NOTE — TELEPHONE ENCOUNTER
RN cannot approve Refill Request    RN can NOT refill this medication PCP messaged that patient is overdue for Labs. Last office visit: 5/16/2019 Lukas Lee MD Last Physical: Visit date not found Last MTM visit: Visit date not found Last visit same specialty: 5/16/2019 Lukas Lee MD.  Next visit within 3 mo: Visit date not found  Next physical within 3 mo: Visit date not found      Marina Mehta, [facility] Triage/Med Refill 8/17/2019    Requested Prescriptions   Pending Prescriptions Disp Refills     metFORMIN (GLUCOPHAGE-XR) 500 MG 24 hr tablet [Pharmacy Med Name: METFORMIN HYDROCHLORIDE  MG Tablet Extended Release 24 Hour] 360 tablet 1     Sig: TAKE 2 TABLETS TWICE DAILY.       Metformin Refill Protocol Failed - 8/17/2019  8:15 AM        Failed - Microalbumin in last year      Microalbumin, Random Urine   Date Value Ref Range Status   02/08/2018 3.23 (H) 0.00 - 1.99 mg/dL Final                  Passed - Blood pressure in last 12 months     BP Readings from Last 1 Encounters:   05/16/19 130/78             Passed - LFT or AST or ALT in last 12 months     Albumin   Date Value Ref Range Status   02/14/2019 4.1 3.5 - 5.0 g/dL Final     Bilirubin, Total   Date Value Ref Range Status   02/14/2019 0.7 0.0 - 1.0 mg/dL Final     Alkaline Phosphatase   Date Value Ref Range Status   02/14/2019 66 45 - 120 U/L Final     AST   Date Value Ref Range Status   02/14/2019 14 0 - 40 U/L Final     ALT   Date Value Ref Range Status   02/14/2019 9 0 - 45 U/L Final     Protein, Total   Date Value Ref Range Status   02/14/2019 6.9 6.0 - 8.0 g/dL Final                Passed - GFR or Serum Creatinine in last 6 months     GFR MDRD Non Af Amer   Date Value Ref Range Status   02/14/2019 >60 >60 mL/min/1.73m2 Final     GFR MDRD Af Amer   Date Value Ref Range Status   02/14/2019 >60 >60 mL/min/1.73m2 Final             Passed - Visit with PCP or prescribing provider visit in last 6 months or next 3 months     Last  office visit with prescriber/PCP: 5/16/2019 OR same dept: 5/16/2019 Lukas Lee MD OR same specialty: 5/16/2019 Lukas Lee MD Last physical: Visit date not found Last MTM visit: Visit date not found         Next appt within 3 mo: Visit date not found  Next physical within 3 mo: Visit date not found  Prescriber OR PCP: Lukas Lee MD  Last diagnosis associated with med order: 1. Type 2 diabetes mellitus with complication, without long-term current use of insulin (H)  - metFORMIN (GLUCOPHAGE-XR) 500 MG 24 hr tablet [Pharmacy Med Name: METFORMIN HYDROCHLORIDE  MG Tablet Extended Release 24 Hour]; TAKE 2 TABLETS TWICE DAILY.  Dispense: 360 tablet; Refill: 1     If protocol passes may refill for 12 months if within 3 months of last provider visit (or a total of 15 months).           Passed - A1C in last 6 months     Hemoglobin A1c   Date Value Ref Range Status   05/16/2019 9.5 (H) 3.5 - 6.0 % Final

## 2021-06-01 VITALS — BODY MASS INDEX: 26.03 KG/M2 | HEIGHT: 66 IN | WEIGHT: 162 LBS

## 2021-06-01 VITALS — BODY MASS INDEX: 26.55 KG/M2 | WEIGHT: 162 LBS

## 2021-06-01 VITALS — WEIGHT: 163 LBS | BODY MASS INDEX: 26.71 KG/M2

## 2021-06-02 VITALS — WEIGHT: 165 LBS | BODY MASS INDEX: 27.04 KG/M2

## 2021-06-02 VITALS — BODY MASS INDEX: 27.37 KG/M2 | WEIGHT: 167 LBS

## 2021-06-02 VITALS — WEIGHT: 164.75 LBS | BODY MASS INDEX: 27 KG/M2

## 2021-06-03 VITALS
SYSTOLIC BLOOD PRESSURE: 130 MMHG | WEIGHT: 173 LBS | HEART RATE: 80 BPM | HEIGHT: 65 IN | BODY MASS INDEX: 28.82 KG/M2 | DIASTOLIC BLOOD PRESSURE: 72 MMHG

## 2021-06-03 VITALS
HEART RATE: 94 BPM | DIASTOLIC BLOOD PRESSURE: 64 MMHG | SYSTOLIC BLOOD PRESSURE: 138 MMHG | BODY MASS INDEX: 29.57 KG/M2 | OXYGEN SATURATION: 96 % | WEIGHT: 175 LBS

## 2021-06-03 NOTE — TELEPHONE ENCOUNTER
Called and spoke with Stephan Clark , Message was given, Stephan Clark  understood, no further questions.

## 2021-06-03 NOTE — TELEPHONE ENCOUNTER
Called and spoke with patient. Went over 's message from result notes to patient again. She stated she wants to discontinue Januvia because it is too expensive for her $131.00. She wants to know if there is anything else she can take to replace this. Patient was inform Dr. Lee is not in office. She is okay to wait until mon 11/18/2019.     From Dr. Lee's Message on Result notes.   Patient was contacted.  She has been taking Januvia glipizide and metformin max doses    I am adding pioglitazone 30 mg a day she will work harder on her diet    Recheck in 6 weeks    She has not been checking blood sugars I would like her to do that she will think about that    She does not want to go on insulin.

## 2021-06-03 NOTE — TELEPHONE ENCOUNTER
Question following Office Visit    When did you see your provider: 11/12/19    What is your question:     Patient thought PCP was stopping one of her medications at her last visit and adding another.  Patient inquiring about med changes, please call and advise.    Okay to leave a detailed message: Yes

## 2021-06-03 NOTE — PROGRESS NOTES
Subjective: This patient comes in for evaluation this 74-year-old female has diabetes she is on glipizide 5 mg 2 twice a day metformin 500 mg 2 twice a day and Januvia 100 mg a day.  She wondered about changing the Januvia because of cost.  We will check the A1c and discuss that with her.    She has not been checking her blood sugars.    She is also on atorvastatin for lipids and lisinopril for blood pressure    Blood pressure looks good today.    She is due for a flu shot and PPV 23 these were both given.    Denies any additional issues or problems she does have microalbumin urea.    Labs today included lipid A1c and also microalbumin.    Tobacco status: She  reports that she has never smoked. She has never used smokeless tobacco.    Patient Active Problem List    Diagnosis Date Noted     Microalbuminuria 05/10/2018     Diabetes mellitus (H) 02/08/2018     Hypertension 02/08/2018     Hyperlipidemia 02/08/2018     Diverticula of colon 02/08/2018     H/O colonoscopy 02/08/2018       Current Outpatient Medications   Medication Sig Dispense Refill     aspirin 81 mg chewable tablet Chew 81 mg daily.        metFORMIN (GLUCOPHAGE-XR) 500 MG 24 hr tablet TAKE 2 TABLETS TWICE DAILY. 360 tablet 0     sitaGLIPtin (JANUVIA) 100 MG tablet Take 1 tablet (100 mg total) by mouth daily. With or without food 30 tablet 6     atorvastatin (LIPITOR) 40 MG tablet Take 1 tablet (40 mg total) by mouth daily. 90 tablet 1     CALCIUM CARBONATE/VITAMIN D3 (CALCIUM 500 + D, D3, ORAL) Take 1 tablet by mouth daily as needed.       CINNAMON BARK (CINNAMON ORAL) Take 1 capsule by mouth daily as needed.       fluocinolone (SYNALAR) 0.01 % external solution Rub into affected scalp 3 times a week 60 mL 2     glipiZIDE (GLUCOTROL XL) 5 MG 24 hr tablet TAKE 2 TABLETS TWICE DAILY WITH MEALS 360 tablet 1     lisinopril (PRINIVIL,ZESTRIL) 10 MG tablet Take 1 tablet (10 mg total) by mouth daily. 90 tablet 1     No current facility-administered medications  "for this visit.        ROS:   10 point review of systems positive as discussed above otherwise negative, denies any hypoglycemic symptoms.  Objective:    /72 (Patient Site: Left Arm, Patient Position: Sitting, Cuff Size: Adult Regular)   Pulse 80   Ht 5' 4.5\" (1.638 m)   Wt 173 lb (78.5 kg)   BMI 29.24 kg/m    Body mass index is 29.24 kg/m .      General appearance no acute distress    Vital signs are stable weight is stable    HEENT neck is supple oropharynx clear pupils react normally extract movements full she has not been seeing an ophthalmologist we did discuss this she said she think about it.    Last A1c was 9.5.  Back in May please see discussion    Lungs are clear no rales or rhonchi heart was regular S1-S2 rate at 80    Abdomen nontender no guarding or rebound    Lower extremities without edema pulses normal sensation normal.    Skin was normal no rashes.    Lab work A1c microalbumin and lipid pending    Assessment:  1. Type 2 diabetes mellitus with complication, without long-term current use of insulin (H)  Glycosylated Hemoglobin A1c    Microalbumin, Random Urine    glipiZIDE (GLUCOTROL XL) 5 MG 24 hr tablet   2. Hyperlipidemia  atorvastatin (LIPITOR) 40 MG tablet    Lipid Cascade FASTING   3. Essential hypertension  lisinopril (PRINIVIL,ZESTRIL) 10 MG tablet   4. Needs flu shot  Influenza High Dose, Seasonal 65+ yrs   5. Need for prophylactic vaccination against Streptococcus pneumoniae (pneumococcus)  Pneumococcal polysaccharide vaccine 23-valent 3 yo or older, subq/IM   6. Microalbuminuria       Diabetes mellitus await results.  Patient wonders about changing from Januvia because of cost.  She is also on the glipizide metformin.    Hyperlipidemia on atorvastatin 40 mg daily await results    She is had normal liver and kidney function    Hypertension and microalbumin controlled with lisinopril.    Immunization update with flu shot and PPV 23    Plan: As discussed above she will be contacted " with results and discuss follow-up  This transcription uses voice recognition software, which may contain typographical errors.

## 2021-06-03 NOTE — TELEPHONE ENCOUNTER
Okay she can stop the Januvia.  But I would like her to go up to 45 mg on the new medication, pioglitazone.  She can take 1-1/2 tablets of the 30 mg dose that I sent last week. I sent a new prescription for the 45 mg tablet today.  She would take 1 a day on the 45 mg dose.    I still want to see her back for recheck in 6 weeks.  She should stay on the other medications glipizide and metformin as well

## 2021-06-04 VITALS
DIASTOLIC BLOOD PRESSURE: 70 MMHG | SYSTOLIC BLOOD PRESSURE: 138 MMHG | OXYGEN SATURATION: 98 % | HEART RATE: 76 BPM | WEIGHT: 177 LBS | TEMPERATURE: 97.7 F | BODY MASS INDEX: 29.91 KG/M2

## 2021-06-04 VITALS
SYSTOLIC BLOOD PRESSURE: 140 MMHG | DIASTOLIC BLOOD PRESSURE: 69 MMHG | HEART RATE: 92 BPM | RESPIRATION RATE: 22 BRPM | TEMPERATURE: 98.8 F

## 2021-06-04 NOTE — PROGRESS NOTES
Subjective: Patient comes in for follow-up was here on 11/12/2019.  Patient had A1c up to 10.5.    She is no longer on Januvia because of cost but does take an increased dose of pioglitazone at 45 mg a day.  Also is on metformin 500 mg 2 twice a day and glipizide ER 5 mg 2 twice a day.    Patient is on atorvastatin 40 mg daily.    She has been on lisinopril 10 mg a day as well pressure was borderline initially 160/74 then 142/68 and 138/64.    I did increase her from 10 to 20 mg a day on the lisinopril she does have microalbumin.    Denies any problems she is not checking blood sugars.    Patient denies any hypoglycemic symptoms denies any fatigue chest pain shortness of breath bowel or bladder issues.    Tobacco status: She  reports that she has never smoked. She has never used smokeless tobacco.    Patient Active Problem List    Diagnosis Date Noted     Microalbuminuria 05/10/2018     Diabetes mellitus (H) 02/08/2018     Hypertension 02/08/2018     Hyperlipidemia 02/08/2018     Diverticula of colon 02/08/2018     H/O colonoscopy 02/08/2018       Current Outpatient Medications   Medication Sig Dispense Refill     aspirin 81 mg chewable tablet Chew 81 mg daily.        atorvastatin (LIPITOR) 40 MG tablet Take 1 tablet (40 mg total) by mouth daily. 90 tablet 1     glipiZIDE (GLUCOTROL XL) 5 MG 24 hr tablet TAKE 2 TABLETS TWICE DAILY WITH MEALS 360 tablet 1     pioglitazone (ACTOS) 45 MG tablet Take 1 tablet (45 mg total) by mouth daily. 30 tablet 11     CALCIUM CARBONATE/VITAMIN D3 (CALCIUM 500 + D, D3, ORAL) Take 1 tablet by mouth daily as needed.       CINNAMON BARK (CINNAMON ORAL) Take 1 capsule by mouth daily as needed.       fluocinolone (SYNALAR) 0.01 % external solution Rub into affected scalp 3 times a week 60 mL 2     lisinopril (PRINIVIL,ZESTRIL) 20 MG tablet Take 1 tablet (20 mg total) by mouth daily. 90 tablet 1     metFORMIN (GLUCOPHAGE-XR) 500 MG 24 hr tablet Take 2 tablets (1,000 mg total) by mouth 2  (two) times a day. 360 tablet 1     No current facility-administered medications for this visit.        ROS: 10 point review of systems positive as discussed above otherwise negative    Objective:    /64 (Patient Site: Left Arm, Patient Position: Sitting, Cuff Size: Adult Regular)   Pulse 94   Wt 175 lb (79.4 kg)   SpO2 96%   BMI 29.57 kg/m    Body mass index is 29.57 kg/m .      General appearance no acute distress    Recheck blood pressure as outlined 138/64    HEENT neck supple no adenopathy oropharynx is clear pupils react normally, no scleral icterus    Heart rate was in the 80-90 range her heart was regular no murmur    Lungs were clear throughout no rales or rhonchi O2 sat 96%.    Abdomen is soft nontender no guarding or rebound    Extremities without edema normal pulses    Normal sensation through the feet.    Recheck A1c pending today    LDL on 11/12/2019 was at 90        Assessment:  1. Type 2 diabetes mellitus with complication, without long-term current use of insulin (H)  metFORMIN (GLUCOPHAGE-XR) 500 MG 24 hr tablet    Glycosylated Hemoglobin A1c   2. Hyperlipidemia, unspecified hyperlipidemia type     3. Essential hypertension  lisinopril (PRINIVIL,ZESTRIL) 20 MG tablet   4. Microalbuminuria  lisinopril (PRINIVIL,ZESTRIL) 20 MG tablet     Diabetes mellitus await results contact patient.  Previously A1c was at 10.5.  Refill on meds as outlined    Hypertension with microalbuminuria, borderline control on blood pressure, will increase lisinopril from 10 to 20 mg a day    Hyperlipidemia controlled with atorvastatin 40 mg daily    Plan: As outlined above we will contact patient regarding the results    This transcription uses voice recognition software, which may contain typographical errors.

## 2021-06-04 NOTE — TELEPHONE ENCOUNTER
Medication Question or Clarification  Who is calling: Pharmacy: [phone]  What medication are you calling about? (include dose and sig)   glipiZIDE (GLUCOTROL XL) 5 MG 24 hr tablet 540 tablet 1 12/30/2019     Sig: TAKE 3 TABLETS TWICE DAILY WITH MEALS        Who prescribed the medication?: [facility] - [address]    What is your question/concern?:     the current dose exceeds the maximum allowed dose of 20mg/day.  Please clarify dosing/directions.    Pharmacy:   [facility] - [address]    Okay to leave a detailed message?: Yes  Site CMT - Please call the pharmacy to obtain any additional needed information.

## 2021-06-05 NOTE — TELEPHONE ENCOUNTER
Patient Returning Call  Reason for call:  Glipizide ER 5 mt Tab  Information relayed to patient:  The clinic needs to call pharmacy to say this dose is ok as the order is hold.  Please advise.   Patient has additional questions:  Yes  If YES, what are your questions/concerns:  See above  Okay to leave a detailed message?: No

## 2021-06-06 NOTE — PROGRESS NOTES
Subjective: This patient comes in for follow-up.  This is a 74-year-old female with hypertension diabetes and hyperlipidemia.    Patient previously had elevated blood pressure.  She was supposed to increase lisinopril to 20 mg a day she has not taken any of her blood pressure medicine today.  She will restart that    Patient last A1c in November was 10.5 LDL was 90.  And December 30 A1c was 10.0.    We increase glipizide to 5 mg 3 tablets twice a day she stayed on metformin 500 mg 2 twice a day and pioglitazone 45 mg 1 a day.  She did have a prescription for the 30 mg tablets of pioglitazone and may have been taking them in addition by mistake.  It is unclear.    Also atorvastatin 40 mg a day, see above regarding LDL.    I did recheck A1c today.    She feels well denies any problems    Tobacco status: She  reports that she has never smoked. She has never used smokeless tobacco.    Patient Active Problem List    Diagnosis Date Noted     Microalbuminuria 05/10/2018     Type 2 diabetes mellitus with complication, without long-term current use of insulin (H) 02/08/2018     Hypertension 02/08/2018     Hyperlipidemia 02/08/2018     Diverticula of colon 02/08/2018     H/O colonoscopy 02/08/2018       Current Outpatient Medications   Medication Sig Dispense Refill     aspirin 81 mg chewable tablet Chew 81 mg daily.        atorvastatin (LIPITOR) 40 MG tablet Take 1 tablet (40 mg total) by mouth daily. 90 tablet 1     CALCIUM CARBONATE/VITAMIN D3 (CALCIUM 500 + D, D3, ORAL) Take 1 tablet by mouth daily as needed.       CINNAMON BARK (CINNAMON ORAL) Take 1 capsule by mouth daily as needed.       fluocinolone (SYNALAR) 0.01 % external solution Rub into affected scalp 3 times a week 60 mL 2     glipiZIDE (GLUCOTROL XL) 5 MG 24 hr tablet TAKE 3 TABLETS TWICE DAILY WITH MEALS 540 tablet 1     lisinopril (PRINIVIL,ZESTRIL) 20 MG tablet Take 1 tablet (20 mg total) by mouth daily. 90 tablet 1     metFORMIN (GLUCOPHAGE-XR) 500 MG 24  hr tablet Take 2 tablets (1,000 mg total) by mouth 2 (two) times a day. 360 tablet 1     pioglitazone (ACTOS) 45 MG tablet Take 1 tablet (45 mg total) by mouth daily. 30 tablet 11     No current facility-administered medications for this visit.        ROS:   10 point review of systems positive as outlined above otherwise negative    Objective:    /70 (Patient Site: Left Arm, Patient Position: Sitting, Cuff Size: Adult Large)   Pulse 76   Temp 97.7  F (36.5  C) (Oral)   Wt 177 lb (80.3 kg)   SpO2 98%   BMI 29.91 kg/m    Body mass index is 29.91 kg/m .      General appearance no acute distress    Vital signs are stable afebrile    Weight is at 177 BMI 29.9.  I again encouraged her to improve her diet in addition to the medication changes.    Lungs were clear no rales or rhonchi heart was regular rate in the 70s.  Recheck blood pressure 138/70.    A1c today is down to 8.7, down from 10.0    6 weeks ago.    Results for orders placed or performed in visit on 02/17/20   Glycosylated Hemoglobin A1c   Result Value Ref Range    Hemoglobin A1c 8.7 (H) 3.5 - 6.0 %       Assessment:  1. Type 2 diabetes mellitus with complication, without long-term current use of insulin (H)  Glycosylated Hemoglobin A1c   2. Essential hypertension  lisinopril (PRINIVIL,ZESTRIL) 20 MG tablet   3. Hyperlipidemia, unspecified hyperlipidemia type     4. Microalbuminuria  lisinopril (PRINIVIL,ZESTRIL) 20 MG tablet     Diabetes mellitus improving.  Continue on same glipizide metformin and pioglitazone (but just 45 mg daily on the pioglitazone).    Get back on lisinopril for the hypertension microalbumin.    Continue atorvastatin        Plan: Recheck in 3 months, bring in meds    This transcription uses voice recognition software, which may contain typographical errors.

## 2021-06-06 NOTE — TELEPHONE ENCOUNTER
Called and spoke with Stephan. Message was given. No other questions. She understood the message.

## 2021-06-06 NOTE — TELEPHONE ENCOUNTER
----- Message from Lukas Lee MD sent at 2/18/2020  1:10 PM CST -----  Please contact this patient, let her know that the A1c did improve its down to 8.7.    Continue the same medications for diabetes as well as her high blood pressure and cholesterol.    Recheck in 3 months, bring in all medications again.

## 2021-06-08 NOTE — TELEPHONE ENCOUNTER
----- Message from Lukas Lee MD sent at 6/3/2020 10:19 AM CDT -----  Please contact this patient, let her know that the A1c was 7.4, which is much better.    Stay on the same medications and recheck in 3 months

## 2021-06-08 NOTE — TELEPHONE ENCOUNTER
Last visit: 05/26/2020  , Medication refill requested. Please authorize medication if appropriate. Thank you.

## 2021-06-08 NOTE — TELEPHONE ENCOUNTER
Called and spoke with Stephan Clark, Message was given, Stephan Clark understood, no further questions.

## 2021-06-08 NOTE — PROGRESS NOTES
"Stephan Clark is a 75 y.o. female who is being evaluated via a billable telephone visit.      The patient has been notified of following:     \"This telephone visit will be conducted via a call between you and your physician/provider. We have found that certain health care needs can be provided without the need for a physical exam.  This service lets us provide the care you need with a short phone conversation.  If a prescription is necessary we can send it directly to your pharmacy.  If lab work is needed we can place an order for that and you can then stop by our lab to have the test done at a later time.    Telephone visits are billed at different rates depending on your insurance coverage. During this emergency period, for some insurers they may be billed the same as an in-person visit.  Please reach out to your insurance provider with any questions.    If during the course of the call the physician/provider feels a telephone visit is not appropriate, you will not be charged for this service.\"    Patient has given verbal consent to a Telephone visit? Yes    What phone number would you like to be contacted at? [phone]     Additional provider notes:     No covid 19 sx  No chest pain or shortness of breath   No known exposures    D mellitus   Last a1c 8.7 feb 2020    ldl 90  Nov 2019    On piogli metformin and glipizide bs at home still around 200    May need additional meds   Will check a1c lab only    Lasix 20 daily and atorv 40 daily    No new issues    10 pt ros pos as above otherwise neg    Assessment/Plan:  1. Type 2 diabetes mellitus with complication, without long-term current use of insulin (H)     2. Essential hypertension     3. Hyperlipidemia, unspecified hyperlipidemia type     4. Microalbuminuria       D mellitus  Await a1c  ? More oral meds   Refuses insulin    htn stable on lisinopril    hyperlipid stable on atorv    Call w results and plans    Phone call duration:  13 minutes  9:28 to 9:41 " am    Lukas Lee MD

## 2021-06-10 NOTE — TELEPHONE ENCOUNTER
----- Message from Jonathan Forman CMA sent at 6/10/2020  8:17 AM CDT -----      ----- Message -----  From: Keagan Shepard MD  Sent: 6/9/2020   1:11 PM CDT  To: [facility] Family Medicine/Ob Support Pool    Please set up Annual Wellness visit virtual visit.

## 2021-06-10 NOTE — PROGRESS NOTES
"  Assessment and Plan:     1. Encounter for Medicare annual wellness exam     2. Type 2 diabetes mellitus with complication, without long-term current use of insulin (H)  Glycosylated Hemoglobin A1c   3. Essential hypertension  Comprehensive Metabolic Panel   4. Hyperlipidemia, unspecified hyperlipidemia type  Lipid Cascade FASTING     This is a 76 yo female here for AWV/exam:  1.  Type II DM - reports levels are acceptable - isn't going to do more about her diabetes - will check labs  2.  Hypertension - blood pressure is borderline - will check labs - continue medications - encourage compliance  3.  Hyperlipidemia - check labs      The patient's current medical problems were reviewed.    I have had an Advance Directives discussion with the patient.  The following health maintenance schedule was reviewed with the patient and provided in printed form in the after visit summary:   Health Maintenance   Topic Date Due     DIABETIC EYE EXAM  1945     ZOSTER VACCINES (1 of 2) 04/14/1993     DXA SCAN  07/28/2010     INFLUENZA VACCINE RULE BASED (1) 08/01/2020     MICROALBUMIN  11/12/2020     A1C  02/25/2021     MEDICARE ANNUAL WELLNESS VISIT  08/25/2021     BMP  08/25/2021     DIABETIC FOOT EXAM  08/25/2021     LIPID  08/25/2021     FALL RISK ASSESSMENT  08/25/2021     ADVANCE CARE PLANNING  02/08/2023     TD 18+ HE  02/14/2029     PNEUMOCOCCAL IMMUNIZATION 65+ LOW/MEDIUM RISK  Completed     HEPATITIS B VACCINES  Discontinued        Subjective:   Chief Complaint: Stephan Clark is an 75 y.o. female here for an Annual Wellness visit.   HPI:    Diabetes - \"sometimes high\"    Likes fruit, strawberry, blueberries    Lives by self - lives in apartment - will shovel by door in winter; doesn't have yard work  Drives - appetite is good  Doesn't need cane/walker    Eye exam:  Not too long ago - [facility] -   Drinks a lot of milk - doesn't take her calcium/Vitamin D - doesn't want DXA      Review of Systems: Please see " above.  The rest of the review of systems are negative for all systems.    Patient Care Team:  Lukas Lee MD as PCP - General (Family Medicine)  Lukas Lee MD as Assigned PCP     Patient Active Problem List   Diagnosis     Type 2 diabetes mellitus with complication, without long-term current use of insulin (H)     Hypertension     Hyperlipidemia     Diverticula of colon     H/O colonoscopy     Microalbuminuria     History reviewed. No pertinent past medical history.   History reviewed. No pertinent surgical history.   History reviewed. No pertinent family history.   Social History     Socioeconomic History     Marital status:      Spouse name: Not on file     Number of children: Not on file     Years of education: Not on file     Highest education level: Not on file   Occupational History     Not on file   Social Needs     Financial resource strain: Not on file     Food insecurity     Worry: Not on file     Inability: Not on file     Transportation needs     Medical: Not on file     Non-medical: Not on file   Tobacco Use     Smoking status: Never Smoker     Smokeless tobacco: Never Used   Substance and Sexual Activity     Alcohol use: No     Drug use: Not on file     Sexual activity: Not on file   Lifestyle     Physical activity     Days per week: Not on file     Minutes per session: Not on file     Stress: Not on file   Relationships     Social connections     Talks on phone: Not on file     Gets together: Not on file     Attends Faith service: Not on file     Active member of club or organization: Not on file     Attends meetings of clubs or organizations: Not on file     Relationship status: Not on file     Intimate partner violence     Fear of current or ex partner: Not on file     Emotionally abused: Not on file     Physically abused: Not on file     Forced sexual activity: Not on file   Other Topics Concern     Not on file   Social History Narrative     Not on file      Current  Outpatient Medications   Medication Sig Dispense Refill     aspirin 81 mg chewable tablet Chew 81 mg daily.        atorvastatin (LIPITOR) 40 MG tablet TAKE 1 TABLET (40 MG TOTAL) BY MOUTH DAILY. 90 tablet 0     lisinopriL (PRINIVIL,ZESTRIL) 20 MG tablet TAKE 1 TABLET EVERY DAY 90 tablet 0     metFORMIN (GLUCOPHAGE-XR) 500 MG 24 hr tablet TAKE 2 TABLETS TWICE DAILY 360 tablet 1     pioglitazone (ACTOS) 45 MG tablet TAKE 1 TABLET EVERY DAY (REPLACES THE 30MG DOSE) 90 tablet 0     glipiZIDE (GLUCOTROL XL) 5 MG 24 hr tablet TAKE 3 TABLETS TWICE DAILY WITH MEALS 540 tablet 0     No current facility-administered medications for this visit.       Objective:   Vital Signs:   Visit Vitals  /69 (Patient Site: Right Arm, Patient Position: Sitting, Cuff Size: Adult Large)   Pulse 92   Temp 98.8  F (37.1  C)   Resp 22          VisionScreening:   Hearing Screening    125Hz 250Hz 500Hz 1000Hz 2000Hz 3000Hz 4000Hz 6000Hz 8000Hz   Right ear:            Left ear:               Visual Acuity Screening    Right eye Left eye Both eyes   Without correction:      With correction: 10/40 10/40 10/40        PHYSICAL EXAM  EXAM:  /69 (Patient Site: Right Arm, Patient Position: Sitting, Cuff Size: Adult Large)   Pulse 92   Temp 98.8  F (37.1  C)   Resp 22    Gen:  NAD, appears well, well-hydrated  HEENT:  TMs nl, oropharynx benign, nasal mucosa nl, conjunctiva clear  Neck:  Supple, no adenopathy, no thyromegaly, no carotid bruits, no JVD  Lungs:  Clear to auscultation bilaterally  ,Breast exam:  No breast lumps, no skin changes, no nipple discharge, no axillary adenopathy  Cor:  RRR no murmur  Abd:  Soft, nontender, BS+, no masses, no guarding or rebound, no HSM  Extr:  Neg.  Neuro:  No asymmetry  Skin:  Warm/dry        Assessment Results 8/25/2020   Activities of Daily Living No help needed   Instrumental Activities of Daily Living 1 - Full function   Mini Cog Total Score 5     A Mini-Cog score of 0-2 suggests the possibility  of dementia, score of 3-5 suggests no dementia  Fall risk - generally normal  Cognitive risk - negligible according to testing    Identified Health Risks:     She is at risk for lack of exercise and has been provided with information to increase physical activity for the benefit of her well-being.  The patient was counseled and encouraged to consider modifying their diet and eating habits. She was provided with information on recommended healthy diet options.  Information regarding advance directives (living yanez), including where she can download the appropriate form, was provided to the patient via the AVS.       The patient was provided with appropriate referrals to address her memory problem.

## 2021-06-12 NOTE — TELEPHONE ENCOUNTER
Please contact this patient and she is due for a follow-up virtual visit with Dr. Lee in early January, please schedule    Let her know that I did refill her medications

## 2021-06-14 NOTE — TELEPHONE ENCOUNTER
Reason for Call:  Other call back      Detailed comments: cardiac assesment bethanie is calling to see if we received a form for this patient and would like a call back please    Phone Number Patient can be reached at: Other phone number:  [phone]*    Best Time: anytime    Can we leave a detailed message on this number?: Yes    Call taken on 1/21/2021 at 12:30 PM by Liss Calix

## 2021-06-14 NOTE — TELEPHONE ENCOUNTER
Called the pt and checked with the pt if this was something she wanted done and per pt said no. No further questions. Completing task.

## 2021-06-14 NOTE — TELEPHONE ENCOUNTER
Reason for Call:  Other call back      Detailed comments: checking to see if  recieved a requisition for pt to have cardiac risk assessment with [facility]     Phone Number Patient can be reached at: Other phone number:  [phone]    Best Time: anytime    Can we leave a detailed message on this number?: Yes    Call taken on 1/13/2021 at 11:21 AM by Aline López

## 2021-06-14 NOTE — PROGRESS NOTES
Stephan lCark is a 75 y.o. female who is being evaluated via a billable telephone visit.      What phone number would you like to be contacted at? [phone]   How would you like to obtain your AVS? AVS Preference: Mail a copy.  Assessment & Plan     Stephan was seen today for follow-up.    Diagnoses and all orders for this visit:    Type 2 diabetes mellitus with complication, without long-term current use of insulin (H)  -     Glycosylated Hemoglobin A1c; Future  -     Microalbumin, Random Urine; Future  -     Ambulatory referral to [place] - [facility] [place]    Immunization counseling  -     Varicella Zoster, Recombinant Vaccine IM; Future    Essential hypertension    Hyperlipidemia, unspecified hyperlipidemia type    Microalbuminuria      Patient has diabetes seems to be in fairly good control will recheck A1c stay on the glipizide Metformin and pioglitazone.    Patient will get a shingles shot if her insurance covers it order was placed    We will recheck blood pressure stay on lisinopril 20 mg a day consider increasing.  We will also check microalbumin and fasting creasing we will also be a reason to increase her lisinopril from 20 mg.    Hyperlipidemia controlled with atorvastatin 40 mg a day LDL was normal liver test normal.    Work harder on diet with decreasing carbohydrates for the triglyceride elevation.        23 minutes spent on the virtual telephone visit on the date of the encounter doing chart review, history and exam, documentation and further activities as noted above        Return in about 3 months (around 4/6/2021) for Recheck.  Patient be contacted with the lab results  Lukas Lee MD  [facility]     Stephan Clark is 75 y.o. and presents to clinic today for the following health issues   HPI   This patient had a virtual visit, telephone, due to the coronavirus pandemic.    This patient follows up on her diabetes hypertension  hyperlipidemia    She has microalbuminuria    We also discussed immunizations she has had a flu shot but needs her Shingrix shot she will check on insurance.    She is due for an A1c and microalbumin    Blood sugars at home after eating are in the high 100s.  She is on glipizide Metformin and pioglitazone.    She is on atorvastatin for lipids and lisinopril for blood pressure and microalbumin.    Last blood pressure was 140/69 back in August when she had an annual wellness visit.    Labs in August showed A1c 7.0 blood sugar was 238 CMP was normal including normal liver and renal function.    LDL 47 triglyceride 297    She is trying to stay active and watch carbohydrates.    Denies any COVID-19 exposure or symptoms  Review of Systems  10 point review of systems positive as outlined above      Objective       Vitals:  No vitals were obtained today due to virtual visit.    Physical Exam  General: No acute distress    HEENT denies any nasal congestion or cough or sore throat.    Lungs: Nonlabored breathing no wheezing.    Heart: No palpitations or rapid heart rate no chest pressure    Extremities without edema no pain.    Denies any bowel or bladder issues or abdominal pain.

## 2021-06-14 NOTE — TELEPHONE ENCOUNTER
DOD    Received Rx request from [facility]. Pharmacy's message stated that this patient is new to the [facility] delivery and has requested refills for the prescriptions that are pended. Please review and sign is appropriate.

## 2021-06-14 NOTE — TELEPHONE ENCOUNTER
No I do not recall this.    Please check with the patient to see if this is something that she wants done.    Sometimes this is a scam to try to get able to get blood tests and screening labs etc.

## 2021-06-14 NOTE — TELEPHONE ENCOUNTER
Left message for the caller to re-fax form to [phone] as we did not see any faxes that came through for this pt. Will watch out for form.

## 2021-06-15 NOTE — TELEPHONE ENCOUNTER
There have been multiple forms from a company that wants to do genetic testing on the patient because of her hypertension.    Please contact the patient and see if she wants this done.  If she wants this done then I need to talk to her with a virtual visit to review this.    Also find out by calling the number back, is this what they are calling about, is that the genetic testing company that is calling regarding this task.    Let the genetic company know that I am not filling out the form, unless the patient schedules a visit and wants this done, and I feel it is appropriate after I talk to her

## 2021-06-15 NOTE — TELEPHONE ENCOUNTER
RN cannot approve Refill Request    RN can NOT refill this medication PCP messaged that patient is overdue for Labs and Protocol failed and NO refill given. Last office visit: 2/17/2020 Lukas Lee MD Last Physical: Visit date not found Last MTM visit: Visit date not found Last visit same specialty: 2/17/2020 Lukas Lee MD.  Next visit within 3 mo: Visit date not found  Next physical within 3 mo: Visit date not found      Mishel Wood, [facility] Triage/Med Refill 3/12/2021    Requested Prescriptions   Pending Prescriptions Disp Refills     glipiZIDE (GLUCOTROL XL) 5 MG 24 hr tablet [Pharmacy Med Name: GLIPIZIDE ER 5 MG Tablet Extended Release 24 Hour] 540 tablet 0     Sig: TAKE 3 TABLETS TWICE DAILY WITH MEALS       Oral Hypoglycemics Refill Protocol Failed - 3/12/2021  1:21 PM        Failed - A1C in last 6 months     Hemoglobin A1c   Date Value Ref Range Status   08/25/2020 7.0 (H) <=5.6 % Final     Comment:     Normal <5.7% Prediabete 5.7-6.4% Diabletes 6.5% or higher - adopted from ADA consensus guidelines               Failed - Microalbumin in last year      Microalbumin, Random Urine   Date Value Ref Range Status   11/12/2019 4.82 (H) 0.00 - 1.99 mg/dL Final                  Passed - Visit with PCP or prescribing provider visit in last 6 months       Last office visit with prescriber/PCP: Visit date not found OR same dept: Visit date not found OR same specialty: 2/17/2020 Lukas Lee MD Last physical: Visit date not found Last MTM visit: Visit date not found         Next appt within 3 mo: Visit date not found  Next physical within 3 mo: Visit date not found  Prescriber OR PCP: Lukas Lee MD  Last diagnosis associated with med order: 1. Type 2 diabetes mellitus with complication, without long-term current use of insulin (H)  - glipiZIDE (GLUCOTROL XL) 5 MG 24 hr tablet [Pharmacy Med Name: GLIPIZIDE ER 5 MG Tablet Extended Release 24 Hour]; TAKE 3 TABLETS TWICE DAILY WITH MEALS   Dispense: 540 tablet; Refill: 0  - pioglitazone (ACTOS) 45 MG tablet [Pharmacy Med Name: PIOGLITAZONE HCL 45 MG Tablet]; TAKE 1 TABLET EVERY DAY (REPLACES THE 30MG DOSE)  Dispense: 90 tablet; Refill: 0     If protocol passes may refill for 12 months if within 3 months of last provider visit (or a total of 15 months).           Passed - Blood pressure in last year     BP Readings from Last 1 Encounters:   08/25/20 140/69             Passed - Serum creatinine in last year     Creatinine   Date Value Ref Range Status   08/25/2020 0.88 0.60 - 1.10 mg/dL Final                pioglitazone (ACTOS) 45 MG tablet [Pharmacy Med Name: PIOGLITAZONE HCL 45 MG Tablet] 90 tablet 0     Sig: TAKE 1 TABLET EVERY DAY (REPLACES THE 30MG DOSE)       Pioglitazone Protocol Failed - 3/12/2021  1:21 PM        Failed - A1C in last 6 months     Hemoglobin A1c   Date Value Ref Range Status   08/25/2020 7.0 (H) <=5.6 % Final     Comment:     Normal <5.7% Prediabete 5.7-6.4% Diabletes 6.5% or higher - adopted from ADA consensus guidelines               Failed - Microalbumin in last year     Microalbumin, Random Urine   Date Value Ref Range Status   11/12/2019 4.82 (H) 0.00 - 1.99 mg/dL Final                  Passed - Blood pressure in last 12 months     BP Readings from Last 1 Encounters:   08/25/20 140/69             Passed - LFT or AST or ALT in last 12 months     Albumin   Date Value Ref Range Status   08/25/2020 4.1 3.5 - 5.0 g/dL Final     Bilirubin, Total   Date Value Ref Range Status   08/25/2020 0.3 0.0 - 1.0 mg/dL Final     Alkaline Phosphatase   Date Value Ref Range Status   08/25/2020 90 45 - 120 U/L Final     AST   Date Value Ref Range Status   08/25/2020 11 0 - 40 U/L Final     ALT   Date Value Ref Range Status   08/25/2020 <9 0 - 45 U/L Final     Protein, Total   Date Value Ref Range Status   08/25/2020 7.3 6.0 - 8.0 g/dL Final                Passed - Visit with PCP or prescribing provider visit in last 6 months      Last office  visit with prescriber/PCP: Visit date not found OR same dept: Visit date not found OR same specialty: 2/17/2020 Lukas Lee MD Last physical: Visit date not found Last MTM visit: Visit date not found         Next appt within 3 mo: Visit date not found  Next physical within 3 mo: Visit date not found  Prescriber OR PCP: Lukas Lee MD  Last diagnosis associated with med order: 1. Type 2 diabetes mellitus with complication, without long-term current use of insulin (H)  - glipiZIDE (GLUCOTROL XL) 5 MG 24 hr tablet [Pharmacy Med Name: GLIPIZIDE ER 5 MG Tablet Extended Release 24 Hour]; TAKE 3 TABLETS TWICE DAILY WITH MEALS  Dispense: 540 tablet; Refill: 0  - pioglitazone (ACTOS) 45 MG tablet [Pharmacy Med Name: PIOGLITAZONE HCL 45 MG Tablet]; TAKE 1 TABLET EVERY DAY (REPLACES THE 30MG DOSE)  Dispense: 90 tablet; Refill: 0     If protocol passes may refill for 12 months if within 3 months of last provider visit (or a total of 15 months).           Passed - Serum creatinine in last year     Creatinine   Date Value Ref Range Status   08/25/2020 0.88 0.60 - 1.10 mg/dL Final                    No

## 2021-06-15 NOTE — PROGRESS NOTES
Assessment and Plan:     1. Routine general medical examination at a health care facility  2. Diabetes  3. Hypertension  4. Hyperlipidemia  - Lipid Cascade, FASTING  - Glycosylated Hemoglobin A1C  - Comprehensive Metabolic Panel  - Microalbumin, Random Urine  5. Diverticula of colon  6. H/O colonoscopy  7. Osteopenia  8. Need for prophylactic vaccination and inoculation against influenz  - Influenza High Dose, Seasonal 65+ yrs  9. Encounter for general adult medical examination with abnormal findings  Reviewed EHR.   Discussed prior diagnosis, need for follow up.   Will follow blood work.   Blood pressure uncontrolled.   Question possibility of dementia.   Advised to return in two weeks for blood pressure check and follow up of diabetes.   Continue ASA.   Advised on mammogram, she declines.   Pap smear reviewed, she declines pelvic exam.   Colonoscopy due in 2021.   Defers repeat DEXA.   Agrees to update immunizations, influenza and pneumovax.   Discussed healthy lifestyle modifications, breast exam.     The patient's current medical problems were reviewed.    I have had an Advance Directives discussion with the patient.  The following high BMI interventions were performed this visit: encouragement to exercise and weight monitoring  The following health maintenance schedule was reviewed with the patient and provided in printed form in the after visit summary:   Health Maintenance   Topic Date Due     ZOSTER VACCINE  04/14/2003     PNEUMOCOCCAL CONJUGATE VACCINE FOR ADULTS (PCV13 OR PREVNAR)  04/14/2008     DXA SCAN  07/28/2010     TD 18+ HE  03/08/2016     INFLUENZA VACCINE RULE BASED (1) 08/01/2017     FALL RISK ASSESSMENT  02/08/2019     COLONOSCOPY  08/07/2021     ADVANCE DIRECTIVES DISCUSSED WITH PATIENT  02/08/2023     PNEUMOCOCCAL POLYSACCHARIDE VACCINE AGE 65 AND OVER  Completed     MAMMOGRAM  Excluded        Subjective:   Chief Complaint: Stephan Clark is an 72 y.o. female here for an Annual Wellness  "visit.   HPI:  [age] year old female here for annual visit.   New patient.   Not seen since 2011 at [facility]. She reports loss of insurance.   She reports no significant past medical history. Is taking supplements as needed such as vitamin C, cranberry capsules, cinnamon. Taking ASA and vitamin D regularly.   EHR reviewed. She has history of diabetes, hyperlipidemia, hypertension, diverticula, osteopenia. She has not taken any prescribed medications in several years.   She notes history of TL.   Lives alone. . One son in [place].   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     Review of Systems:    ROS: 12 systems reviewed, all negative except for what is mentioned in HPI.  Please see above.  The rest of the review of systems are negative for all systems.    Patient Care Team:  No Primary Care Provider as PCP - General     Patient Active Problem List   Diagnosis     Diabetes     Hypertension     Hyperlipidemia     Diverticula of colon     H/O colonoscopy     No past medical history on file.   No past surgical history on file.   No family history on file.   Social History     Social History     Marital status:      Spouse name: N/A     Number of children: N/A     Years of education: N/A     Occupational History     retired     Social History Main Topics     Smoking status: Never Smoker     Smokeless tobacco: Never Used     Alcohol use No     Drug use: Not on file     Sexual activity: Not on file          Current Outpatient Prescriptions   Medication Sig Dispense Refill     aspirin 81 mg chewable tablet Chew 81 mg daily.        No current facility-administered medications for this visit.       Objective:   Vital Signs:   Visit Vitals     /84 (Patient Site: Left Arm, Patient Position: Sitting, Cuff Size: Adult Regular)     Pulse 80     Temp 96.9  F (36.1  C) (Oral)     Resp 20     Ht 5' 4.25\" (1.632 m)     Wt 157 lb (71.2 kg)     BMI 26.74 kg/m2    "     VisionScreening:   Visual Acuity Screening    Right eye Left eye Both eyes   Without correction: 10/40 10/10    With correction:           PHYSICAL EXAM  Vitals:    02/08/18 0925   BP: 154/84   Pulse:    Resp:    Temp:        General Appearance:  Alert, cooperative, no distress, appears stated age   Head:  Normocephalic, without obvious abnormality, atraumatic   Eyes:  PERRL, conjunctiva/corneas clear, EOM's intact   Ears:  Normal TM's and external ear canals, both ears   Nose: Nares normal, septum midline,mucosa normal, no drainage    Throat: Lips, mucosa, and tongue normal   Neck: Supple, symmetrical, trachea midline, no adenopathy;  thyroid: not enlarged, symmetric, no tenderness/mass/nodules; no carotid bruit or JVD   Lungs:   Clear to auscultation bilaterally, respirations unlabored   Breasts:  No breast masses, tenderness, asymmetry, or nipple discharge.   Heart:  Regular rate and rhythm, S1 and S2 normal, no murmur, rub, or gallop   Abdomen:   Soft, non-tender, bowel sounds active all four quadrants,  no masses, no organomegaly   Genitalia: declines   Extremities: Extremities normal, atraumatic, no cyanosis or edema   Skin: Skin color, texture, turgor normal, no rashes or lesions   Lymph nodes: Cervical, supraclavicular, and axillary nodes normal   Neurologic: Normal, no focal deficits, normal strength and tone, normal gait         Assessment Results 2/8/2018   Activities of Daily Living No help needed   Instrumental Activities of Daily Living No help needed   Mini Cog Total Score 2     A Mini-Cog score of 0-2 suggests the possibility of dementia, score of 3-5 suggests no dementia    Identified Health Risks:     She is at risk for lack of exercise and has been provided with information to increase physical activity for the benefit of her well-being.  The patient was counseled and encouraged to consider modifying their diet and eating habits. She was provided with information on recommended healthy diet  options.  She is not at risk for falling and has been provided with information to reduce the risk of falling at home.  Information regarding advance directives (living yanez) was provided to the patient via the AVS.

## 2021-06-15 NOTE — TELEPHONE ENCOUNTER
Refill Approved    Rx renewed per Medication Renewal Policy. Medication was last renewed on 12/15/20.    Steven Pérez, [facility] Connection Triage/Med Refill 2/23/2021     Requested Prescriptions   Pending Prescriptions Disp Refills     lisinopriL (PRINIVIL,ZESTRIL) 20 MG tablet [Pharmacy Med Name: LISINOPRIL 20 MG Tablet] 90 tablet 0     Sig: TAKE 1 TABLET EVERY DAY       Ace Inhibitors Refill Protocol Passed - 2/22/2021  2:42 PM        Passed - PCP or prescribing provider visit in past 12 months       Last office visit with prescriber/PCP: Visit date not found OR same dept: Visit date not found OR same specialty: 2/17/2020 Lukas Lee MD  Last physical: Visit date not found Last MTM visit: Visit date not found   Next visit within 3 mo: Visit date not found  Next physical within 3 mo: Visit date not found  Prescriber OR PCP: Abhay Arce MD  Last diagnosis associated with med order: 1. Essential hypertension  - lisinopriL (PRINIVIL,ZESTRIL) 20 MG tablet [Pharmacy Med Name: LISINOPRIL 20 MG Tablet]; TAKE 1 TABLET EVERY DAY  Dispense: 90 tablet; Refill: 0    2. Microalbuminuria  - lisinopriL (PRINIVIL,ZESTRIL) 20 MG tablet [Pharmacy Med Name: LISINOPRIL 20 MG Tablet]; TAKE 1 TABLET EVERY DAY  Dispense: 90 tablet; Refill: 0    If protocol passes may refill for 12 months if within 3 months of last provider visit (or a total of 15 months).             Passed - Serum Potassium in past 12 months     Lab Results   Component Value Date    Potassium 4.4 08/25/2020             Passed - Blood pressure filed in past 12 months     BP Readings from Last 1 Encounters:   08/25/20 140/69             Passed - Serum Creatinine in past 12 months     Creatinine   Date Value Ref Range Status   08/25/2020 0.88 0.60 - 1.10 mg/dL Final

## 2021-06-15 NOTE — TELEPHONE ENCOUNTER
Called the pt and per pt said no to genetic testing. I left a detailed message for [facility] with the pt's response. Completing task.

## 2021-06-15 NOTE — TELEPHONE ENCOUNTER
Reason for Call:  Form, our goal is to have forms completed with 72 hours, however, some forms may require a visit or additional information.    Type of letter, form or note:  Faxed med form    Who is the form from?: Insurance comp    Where did the form come from:[place] form was faxed in    What clinic location was the form placed at?: [facility]    Where the form was placed: ?    What number is listed as a contact on the form?: [phone]       Additional comments: n/a    Call taken on 2/17/2021 at 8:24 AM by Liss Calix

## 2021-06-16 PROBLEM — K57.30 DIVERTICULA OF COLON: Status: ACTIVE | Noted: 2018-02-08

## 2021-06-16 PROBLEM — E78.5 HYPERLIPIDEMIA: Status: ACTIVE | Noted: 2018-02-08

## 2021-06-16 PROBLEM — E11.8 TYPE 2 DIABETES MELLITUS WITH COMPLICATION, WITHOUT LONG-TERM CURRENT USE OF INSULIN (H): Status: ACTIVE | Noted: 2018-02-08

## 2021-06-16 PROBLEM — Z98.890 H/O COLONOSCOPY: Status: ACTIVE | Noted: 2018-02-08

## 2021-06-16 PROBLEM — R80.9 MICROALBUMINURIA: Status: ACTIVE | Noted: 2018-05-10

## 2021-06-16 PROBLEM — I10 HYPERTENSION: Status: ACTIVE | Noted: 2018-02-08

## 2021-06-16 NOTE — PROGRESS NOTES
Assessment/Plan:     1. Diabetes  2. Hypertension  3. Hyperlipidemia  4. Microalbuminuria  Uncontrolled.   LDL not at goal.   Elevated blood pressure though repeat is improved.   Discussed microalbumin.   Discussed need to restart medications and treatment.   Started on metformin. Recheck HbA1C in three months.   Started on lisinopril. Will recheck bmp in one month.   Keep scheduled appointments with diabetes education.   Discussed lifestyle modifications, diabetes monitoring. She reports she has a meter at home, will begin monitoring and record for next visit.   Encouraged regular exercise.   Needs annual eye exam.   Start ASA.     5. Seborrheic dermatitis  Start ketoconazole shampoo. Discussed appropriate use. Follow up on this if needed.       Subjective:       Stephan Clark is an 72 y.o. female who presents for follow up of diabetes. Current symptoms include: none. Patient denies foot ulcerations, paresthesia of the feet, polydipsia, polyuria, visual disturbances and weight loss. Evaluation to date has included: fasting lipid panel, hemoglobin A1C and microalbuminuria. Home sugars: patient does not check sugars. Current treatments: none. Last dilated eye exam none.     Patient here for follow-up of elevated blood pressure.  She is not exercising and is not adherent to a low-salt diet.  Blood pressure is not well controlled at home. Cardiac symptoms: none. Patient denies: chest pain, exertional chest pressure/discomfort, lower extremity edema, paroxysmal nocturnal dyspnea and syncope. Cardiovascular risk factors: advanced age (older than 55 for men, 65 for women), diabetes mellitus, dyslipidemia, hypertension, microalbuminuria and sedentary lifestyle. Use of agents associated with hypertension: none. History of target organ damage: chronic kidney disease.     Stephan Clark is here for follow up of elevated cholesterol. Compliance with treatment has been inadequate. The patient exercises infrequently     She  reports diabetes in the past. Has not been on medications or treatment in several years. We discussed recent blood work. She reports she has a meter at home but does not use this. Has been scheduled to see diabetes education    She has an itchy rash on her scalp, near her neck. She needs a shampoo. The rash is red, scaly. Has been present for several months.    The following portions of the patient's history were reviewed and updated as appropriate: allergies, current medications, past family history, past medical history, past social history, past surgical history and problem list.    Review of Systems  A 12 point comprehensive review of systems was negative except as noted.      Objective:     Vitals:    02/27/18 0854   BP: 128/70   Pulse:        General Appearance:    Alert, cooperative, no distress, appears stated age   Head:    Normocephalic, without obvious abnormality, atraumatic   Eyes:    PERRL, conjunctiva/corneas clear, EOM's intact   Nose:   Mucosa moist, normal    Throat:   Lips, mucosa, and tongue normal; teeth and gums normal   Neck:   Supple, symmetrical, trachea midline, no adenopathy;     thyroid:  no enlargement/tenderness/nodules; no carotid    bruit or JVD   Lungs:     Clear to auscultation bilaterally, respirations unlabored    Heart:    Regular rate and rhythm, S1 and S2 normal, no murmur, rub   or gallop   Abdomen:     Soft, non-tender, bowel sounds active all four quadrants,     no masses, no organomegaly   Extremities:   Extremities normal, atraumatic, no cyanosis or edema   Pulses:   2+ and symmetric all extremities   Skin:   Skin color, texture, turgor normal, no rashes or lesions   Neurologic:   No focal deficits     Laboratory:  Lab Results   Component Value Date    HGBA1C 13.1 (H) 02/08/2018     Lab Results   Component Value Date    CHOL 236 (H) 02/08/2018     Lab Results   Component Value Date    HDL 47 (L) 02/08/2018     Lab Results   Component Value Date    LDLCALC 151 (H)  02/08/2018     Lab Results   Component Value Date    TRIG 191 (H) 02/08/2018     No components found for: CHOLHDL  Lab Results   Component Value Date    MICROALBUR 3.23 (H) 02/08/2018       Stanley Hidalgo MD

## 2021-06-16 NOTE — PROGRESS NOTES
"                        Medication Therapy Management Initial Visit       ASSESSMENT AND PLAN    1. Type 2 diabetes mellitus with complication, without long-term current use of insulin  Uncontrolled.  Addressed her concerns -we will elect to reduce blood sugars very slowly in order to avoid feelings of hypoglycemia from rapid reduction.  -New: glipiZIDE (GLUCOTROL XL) 2.5 MG 24 hr tablet; Take 1 tablet (2.5 mg total) by mouth daily.  Dispense: 90 tablet; Refill: 3  -Continue metformin 1000 mg twice daily  -Discussed diet and exercise considerations  -Educated about diabetes goals and A1c    2. Hypertension  Controlled.  History of microalbuminuria.  Continue lisinopril 10 mg daily.  Bring in home readings to follow-up.    3. Hyperlipidemia  Currently not on treatment.  LDL not well controlled.  10 year ASCVD risk = 34%.  Indication for high intensity statin is somewhat gray based on her age as she turns 73 next month.  Based on guidelines, high intensity statin would lead to approximately 14% reduction for heart attack or stroke.  Will discuss with PCP and with patient in more detail at follow-up    FOLLOW-UP PLAN  Stephan was advised to follow up in 1 month.        SUBJECTIVE AND OBJECTIVE  Stephan Clark is a 72 y.o. female who was referred by Stanley Hidalgo MD for MTM services.  Stephan's chief concern today is medication management.     Patient forgot to bring in her medications today.  She is a moderate medication historian.  Describes taking \"one big pill 2 times a day and one small pink pill once a day\".  Additionally, she takes a baby aspirin every day for several years, a cinnamon capsule every day every once in a while, and a calcium supplement 1 tablet once per day.    She is relatively new to our clinic.  Established with Dr. Hidalgo approximately 1 month ago.  She endorses long-standing history of diabetes but cannot remember if she has ever tried other medications.  She also has a history of hyperlipidemia for " "which she was previously treated with simvastatin and Lovaza.  Prior to establishing with our clinic, she had been off of medications for several years due to a lapse of insurance.    She does have a glucometer at home.  She purchases supplies and the meter from [employer].  She is not interested in learning a different type of meter.  She self-reports blood sugars in the mid 200s.  Generally checks every morning prior to breakfast, and in the mid afternoon without regard to meals.  She is very worried about dropping blood sugars too quickly.  \"I know my sugars are less than 200, I feel very sick and shaky\".  Exercising less frequently as she is scared to go outside for walks on the ice.  She endorses walking much more in the spring summer and fall.  She had met with diabetes education \"years ago\" at her outside clinic.    She also checks her blood pressure at home, but cannot remember what those numbers were.  She was recently started on lisinopril 10 mg daily, which is the pink pill she describes.  No dizziness, no blurry vision, no lightheadedness.    She does use a pillbox at home that she sits up independently.  She has no concerns with forgetting medication.    Current Outpatient Prescriptions   Medication Sig Dispense Refill     aspirin 81 mg chewable tablet Chew 81 mg daily.        ketoconazole (NIZORAL) 2 % shampoo Apply to damp skin, lather, leave on 5 minutes, and rinse. Use this 2-3 times per week. 120 mL 1     lisinopril (PRINIVIL,ZESTRIL) 10 MG tablet Take 1 tablet (10 mg total) by mouth daily. 30 tablet 11     metFORMIN (GLUCOPHAGE) 1000 MG tablet Take 1 tablet (1,000 mg total) by mouth 2 (two) times a day with meals. 60 tablet 11     No current facility-administered medications for this visit.        We reviewed Stephan's medication list with them, discussing reason for use, directions for use, and potential side effects of each medication.  Indication, safety, efficacy, and convenience was assessed for " all medications.     Stephan was provided with a printed AVS, and this care plan was communicated via EMR with her primary care provider, Stanley Hidalgo MD, and is the authorizing prescriber for this visit.  Direct supervision was available by either the patient's PCP or another available physician when needed.    This note has been dictated using voice recognition software. Any grammatical or context distortions are unintentional and inherent to the software.       Time spent: 60 minutes    Zoë LuevanoD  MTM Pharmacist at [facility]

## 2021-06-16 NOTE — TELEPHONE ENCOUNTER
Refill Approved    Rx renewed per Medication Renewal Policy. Medication was last renewed on 12/30/20.    Steven Pérez, [facility] Triage/Med Refill 4/4/2021     Requested Prescriptions   Pending Prescriptions Disp Refills     atorvastatin (LIPITOR) 40 MG tablet [Pharmacy Med Name: ATORVASTATIN CALCIUM 40 MG Tablet] 90 tablet 0     Sig: TAKE 1 TABLET EVERY DAY       Statins Refill Protocol (Hmg CoA Reductase Inhibitors) Passed - 4/2/2021  1:31 PM        Passed - PCP or prescribing provider visit in past 12 months      Last office visit with prescriber/PCP: 2/17/2020 Lukas Lee MD OR same dept: Visit date not found OR same specialty: 2/17/2020 Lukas Lee MD  Last physical: Visit date not found Last MTM visit: Visit date not found   Next visit within 3 mo: Visit date not found  Next physical within 3 mo: Visit date not found  Prescriber OR PCP: Lukas Lee MD  Last diagnosis associated with med order: 1. Hyperlipidemia  - atorvastatin (LIPITOR) 40 MG tablet [Pharmacy Med Name: ATORVASTATIN CALCIUM 40 MG Tablet]; TAKE 1 TABLET EVERY DAY  Dispense: 90 tablet; Refill: 0    If protocol passes may refill for 12 months if within 3 months of last provider visit (or a total of 15 months).

## 2021-06-17 NOTE — PROGRESS NOTES
Assessment:  Met with Stephan today for the first time, met with Sonja previously for dm education.  Comes in with meter and log book, bg noted below, taking both metformin Xr and glipizide xr without difficulty. XR metformin much better than IM release.    Current dm medications:  Metformin XR two 500 mg tablet bid  Glipizide ER 2.5 mg daily    BG:  Fasting  168  182  178  140  181  158  146  155    Recall indicates yogurt for breakfast, apple for lunch and sandwich between 2-5 Pm, eats nothing after this, worried about bg.  Instructed on basics of heart healthy eating for dm using plate method and food models to illustrate cho foods, affect on bg and need for portion control.  Stephan is clearly not eating enough and not getting adequate kcal or nutrients with current intake.  Devised simple meal plans for breakfast, lunch and dinner, encouraged heart healthy canned soups, frozen meals, fresh fruit and either fresh or frozen vege.  Stephan was receptive to education and meal plans, willing to eat more and add more variety.      Bg noted above and A1c of 9.5, again nice drop in both however still room for improvement and with increased intake suspect higher readings.      Medication adjustments per protocol  Spoke with Carolynn BONILLA, gave verbal order to increase glipizide ER to 5 mg daily.    Instructed Stephan on increase, will finish off 2.5 mg tablets ( take two daily) until finished, then start one 5 mg tablet daily. Instructed on sx.sx and tx for low bg, instructed to call if low bg become a problem.      Printed out AVS with instructions.     Plan:  As above, follow up 6.2018 with CDE    Subjective and Objective:      Stephan Clark is referred by  for Diabetes Education.     Lab Results   Component Value Date    HGBA1C 9.3 (H) 05/10/2018         Current diabetes medications:    Metformin XR two 500 mg tablet bid  Glipizide ER 5 mg daily        Goals       nutrition            1.  Eat small lunch and dinner each  day  2.  Add variety at meals  3. Eat more than apple for lunch            Follow up:   CDE (certified diabetic educator)      Education:     Monitoring   Meter (per above goals): Competent  Monitoring: Assessed and Discussed  BG goals: Assessed and Discussed    Nutrition Management  Nutrition Management: Assessed and Discussed  Weight: declined  Portions/Balance: Assessed, Discussed and Literature provided  Carb ID/Count: Assessed, Discussed and Literature provided  Heart Healthy Fats: Discussed  Menu Planning: Assessed, Discussed and Literature provided  Physical Activity: Not addressed  Medications: Assessed, Discussed and Competent  Orals: Assessed, Discussed and Competent    Diabetes Disease Process: Discussed    Acute Complications: Prevent, Detect, Treat:  Hypoglycemia: Assessed and Discussed  Hyperglycemia: Assessed and Discussed    Chronic Complications  Foot Care:Needs instruction/review at follow-up  Skin Care: Needs instruction/review at follow-up  Eye: Needs instruction/review at follow-up  ABC: Assessed and Discussed  Teeth:Needs instruction/review at follow-up  Goal Setting and Problem Solving: Discussed  Barriers: Assessed  Psychosocial Adjustments: Assessed      Time spent with the patient: 60 minutes for diabetes education and counseling.   Previous Education: no  Visit Type:MNT  Hours Remaining: DSMT 10 and MNT 2      Edith Bear  5/11/2018

## 2021-06-17 NOTE — TELEPHONE ENCOUNTER
RN cannot approve Refill Request    RN can NOT refill this medication PCP messaged that patient is overdue for Labs. Last office visit: 2/17/2020 Lukas Lee MD Last Physical: Visit date not found Last MTM visit: Visit date not found Last visit same specialty: 2/17/2020 Lukas Lee MD.  Next visit within 3 mo: Visit date not found  Next physical within 3 mo: Visit date not found      Bri Humphrey, [facility] Triage/Med Refill 5/20/2021    Requested Prescriptions   Pending Prescriptions Disp Refills     glipiZIDE (GLUCOTROL XL) 5 MG 24 hr tablet [Pharmacy Med Name: GLIPIZIDE ER 5 MG Tablet Extended Release 24 Hour] 540 tablet 0     Sig: TAKE 3 TABLETS TWICE DAILY WITH A MEAL       Oral Hypoglycemics Refill Protocol Failed - 5/19/2021  6:42 PM        Failed - A1C in last 6 months     Hemoglobin A1c   Date Value Ref Range Status   08/25/2020 7.0 (H) <=5.6 % Final     Comment:     Normal <5.7% Prediabete 5.7-6.4% Diabletes 6.5% or higher - adopted from ADA consensus guidelines               Failed - Microalbumin in last year      Microalbumin, Random Urine   Date Value Ref Range Status   11/12/2019 4.82 (H) 0.00 - 1.99 mg/dL Final                  Passed - Visit with PCP or prescribing provider visit in last 6 months       Last office visit with prescriber/PCP: Visit date not found OR same dept: Visit date not found OR same specialty: 2/17/2020 Lukas Lee MD Last physical: Visit date not found Last MTM visit: Visit date not found         Next appt within 3 mo: Visit date not found  Next physical within 3 mo: Visit date not found  Prescriber OR PCP: Lukas Lee MD  Last diagnosis associated with med order: 1. Type 2 diabetes mellitus with complication, without long-term current use of insulin (H)  - glipiZIDE (GLUCOTROL XL) 5 MG 24 hr tablet [Pharmacy Med Name: GLIPIZIDE ER 5 MG Tablet Extended Release 24 Hour]; TAKE 3 TABLETS TWICE DAILY WITH A MEAL  Dispense: 540 tablet; Refill: 0  -  pioglitazone (ACTOS) 45 MG tablet [Pharmacy Med Name: PIOGLITAZONE HCL 45 MG Tablet]; TAKE 1 TABLET EVERY DAY  Dispense: 90 tablet; Refill: 0     If protocol passes may refill for 12 months if within 3 months of last provider visit (or a total of 15 months).           Passed - Blood pressure in last year     BP Readings from Last 1 Encounters:   08/25/20 140/69             Passed - Serum creatinine in last year     Creatinine   Date Value Ref Range Status   08/25/2020 0.88 0.60 - 1.10 mg/dL Final                pioglitazone (ACTOS) 45 MG tablet [Pharmacy Med Name: PIOGLITAZONE HCL 45 MG Tablet] 90 tablet 0     Sig: TAKE 1 TABLET EVERY DAY       Pioglitazone Protocol Failed - 5/19/2021  6:42 PM        Failed - A1C in last 6 months     Hemoglobin A1c   Date Value Ref Range Status   08/25/2020 7.0 (H) <=5.6 % Final     Comment:     Normal <5.7% Prediabete 5.7-6.4% Diabletes 6.5% or higher - adopted from ADA consensus guidelines               Failed - Microalbumin in last year     Microalbumin, Random Urine   Date Value Ref Range Status   11/12/2019 4.82 (H) 0.00 - 1.99 mg/dL Final                  Passed - Blood pressure in last 12 months     BP Readings from Last 1 Encounters:   08/25/20 140/69             Passed - LFT or AST or ALT in last 12 months     Albumin   Date Value Ref Range Status   08/25/2020 4.1 3.5 - 5.0 g/dL Final     Bilirubin, Total   Date Value Ref Range Status   08/25/2020 0.3 0.0 - 1.0 mg/dL Final     Alkaline Phosphatase   Date Value Ref Range Status   08/25/2020 90 45 - 120 U/L Final     AST   Date Value Ref Range Status   08/25/2020 11 0 - 40 U/L Final     ALT   Date Value Ref Range Status   08/25/2020 <9 0 - 45 U/L Final     Protein, Total   Date Value Ref Range Status   08/25/2020 7.3 6.0 - 8.0 g/dL Final                Passed - Visit with PCP or prescribing provider visit in last 6 months      Last office visit with prescriber/PCP: Visit date not found OR same dept: Visit date not found OR  same specialty: 2/17/2020 Lukas Lee MD Last physical: Visit date not found Last MTM visit: Visit date not found         Next appt within 3 mo: Visit date not found  Next physical within 3 mo: Visit date not found  Prescriber OR PCP: Lukas Lee MD  Last diagnosis associated with med order: 1. Type 2 diabetes mellitus with complication, without long-term current use of insulin (H)  - glipiZIDE (GLUCOTROL XL) 5 MG 24 hr tablet [Pharmacy Med Name: GLIPIZIDE ER 5 MG Tablet Extended Release 24 Hour]; TAKE 3 TABLETS TWICE DAILY WITH A MEAL  Dispense: 540 tablet; Refill: 0  - pioglitazone (ACTOS) 45 MG tablet [Pharmacy Med Name: PIOGLITAZONE HCL 45 MG Tablet]; TAKE 1 TABLET EVERY DAY  Dispense: 90 tablet; Refill: 0     If protocol passes may refill for 12 months if within 3 months of last provider visit (or a total of 15 months).           Passed - Serum creatinine in last year     Creatinine   Date Value Ref Range Status   08/25/2020 0.88 0.60 - 1.10 mg/dL Final

## 2021-06-17 NOTE — PROGRESS NOTES
"                        Medication Therapy Management Follow Up Visit       ASSESSMENT AND PLAN    1. Type 2 diabetes mellitus with complication, without long-term current use of insulin  Uncontrolled. FBGs improving. ER formulation of metformin may be more tolerable and help reduce excessive food intake.   -Stopped metformin IR 1,000 mg BID  - Start: metFORMIN (GLUCOPHAGE-XR) 500 MG 24 hr tablet; Take 2 tablets (1,000 mg total) by mouth 2 (two) times a day.  Dispense: 360 tablet; Refill: 1  -Continue glipizide XL 2.5 mg daily. I suspect that we will need to further increase this. Will make one change at a time due to patient nervousness  -Education provided about disease state, rationale for medication, dietary choices, exercise    2. Hypertension  BP elevated today, likely due to not yet taking lisinopril this morning and nervousness in clinic. Home BPs very good. Due for maintenance labs post-ACEI restart.   - Creatinine  - Potassium    FOLLOW-UP PLAN  Stephan was advised to follow up in 2 months. 1 month Dr. Hidalgo and Edith      SUBJECTIVE AND OBJECTIVE  Stephan Clark is a 72 y.o. female who was referred by Stanley Hidalgo MD for MTM services.  Stephan's chief concern today is follow up diabetes management.      She brought in all of her medications today which included: Metformin 1000 mg tablet taking 1 tablet twice daily, glipizide XL 2.5 mg taking daily, aspirin 81 mg taking daily, and lisinopril 10 mg daily.  She states she uses a pillbox at home.  Is not forgetting any doses.  She has cinnamon supplements and calcium supplements at home that she uses \"every once in a while\".    She does not like taking metformin as it caused GI distress.  \"My stomach hurts so bad, have to eat more food to make up for it\".  She worries that she is eating extra food which is affecting her blood sugars.  If she eats more, the stomach upset resolves.  She did appropriately start her glipizide XL 2.5 mg.  She has not had any adverse " "effects from this.  She still remains very hesitant to escalate diabetes medications due to fears for going low.  \"My family members keep telling me that I am going to drop too far and get sick \".  She establishes care with diabetes education next month.  She does not fully understand the diabetes pathophysiology disease state.    She checks fasting blood sugars in the morning.  They are as follows since last visit: 199, 187, 201, 191, 149, 171, 183, 171, 155, 157, 154.  Prior to initiation of glipizide, blood sugars ranged from 188-309, more frequently in the mid-200's. No symptomatic hypoglycemia.     Checks BP at home as well. Recorded the followin/64, 126/67, 117/64, 115/64, 158/68, 139/68, 119/62, 126/63, 131/63, 156/74, 133/58  She admits that she has not taken her lisinopril yet this morning as she has not eaten.  No dizziness, lightheadedness, or blurry vision.  She states that she is very nervous being here.  BP Readings from Last 3 Encounters:   18 150/62   18 128/78   18 128/70     Current Outpatient Prescriptions   Medication Sig Dispense Refill     aspirin 81 mg chewable tablet Chew 81 mg daily.        CALCIUM CARBONATE/VITAMIN D3 (CALCIUM 500 + D, D3, ORAL) Take 1 tablet by mouth daily as needed.       CINNAMON BARK (CINNAMON ORAL) Take 1 capsule by mouth daily as needed.       glipiZIDE (GLUCOTROL XL) 2.5 MG 24 hr tablet Take 1 tablet (2.5 mg total) by mouth daily. 90 tablet 3     lisinopril (PRINIVIL,ZESTRIL) 10 MG tablet Take 1 tablet (10 mg total) by mouth daily. 30 tablet 11     metFORMIN (GLUCOPHAGE) 1000 MG tablet Take 1 tablet (1,000 mg total) by mouth 2 (two) times a day with meals. 60 tablet 11     senna (SENOKOT) 8.6 mg tablet Take 1 tablet by mouth daily as needed.       No current facility-administered medications for this visit.        We reviewed Stephan's medication list with them, discussing reason for use, directions for use, and potential side effects of each " medication.  Indication, safety, efficacy, and convenience was assessed for all reviewed medications.      Stephan was provided with a printed AVS, and this care plan was communicated via EMR with her primary care provider, Stanley Hidalgo MD, and is the authorizing prescriber for this visit.  Direct supervision was available by either the patient's PCP or another available physician when needed.    This note has been dictated using voice recognition software. Any grammatical or context distortions are unintentional and inherent to the software.       Time spent: 45 minutes    Sonja Temple, ZoëD  MTM Pharmacist at [facility]

## 2021-06-18 NOTE — PATIENT INSTRUCTIONS - HE
Patient Instructions by Gladys Marquez MD at 8/25/2020 11:20 AM     Author: Gladys Marquez MD Service: -- Author Type: Physician    Filed: 8/25/2020 11:42 AM Encounter Date: 8/25/2020 Status: Signed    : Gladys Marquez MD (Physician)         Patient Education     Exercise for a Healthier Heart  You may wonder how you can improve the health of your heart. If youre thinking about exercise, youre on the right track. You dont need to become an athlete, but you do need a certain amount of brisk exercise to help strengthen your heart. If you have been diagnosed with a heart condition, your doctor may recommend exercise to help stabilize your condition. To help make exercise a habit, choose safe, fun activities.       Be sure to check with your health care provider before starting an exercise program.    Why exercise?  Exercising regularly offers many healthy rewards. It can help you do all of the following:    Improve your blood cholesterol levels to help prevent further heart trouble    Lower your blood pressure to help prevent a stroke or heart attack    Control diabetes, or reduce your risk of getting this disease    Improve your heart and lung function    Reach and maintain a healthy weight    Make your muscles stronger and more limber so you can stay active    Prevent falls and fractures by slowing the loss of bone mass (osteoporosis)    Manage stress better  Exercise tips  Ease into your routine. Set small goals. Then build on them.  Exercise on most days. Aim for a total of 150 or more minutes of moderate to  vigorous intensity activity each week. Consider 40 minutes, 3 to 4 times a week. For best results, activity should last for 40 minutes on average. It is OK to work up to the 40 minute period over time. Examples of moderate-intensity activity is walking one mile in 15 minutes or 30 to 45 minutes of yard work.  Step up your daily activity level. Along with your  exercise program, try being more active throughout the day. Walk instead of drive. Do more household tasks or yard work.  Choose one or more activities you enjoy. Walking is one of the easiest things you can do. You can also try swimming, riding a bike, or taking an exercise class.  Stop exercising and call your doctor if you:    Have chest pain or feel dizzy or lightheaded    Feel burning, tightness, pressure, or heaviness in your chest, neck, shoulders, back, or arms    Have unusual shortness of breath    Have increased joint or muscle pain    Have palpitations or an irregular heartbeat      [id number] [employer]. [address] [place] [address]. All rights reserved. This information is not intended as a substitute for professional medical care. Always follow your healthcare professional's instructions.         Patient Education   Understanding [employer] MyPlate  The USDA (US Department of Agriculture) has guidelines to help you make healthy food choices. These are called MyPlate. MyPlate shows the food groups that make up healthy meals using the image of a place setting. Before you eat, think about the healthiest choices for what to put onto your plate or into your cup or bowl. To learn more about building a healthy plate, visit www.choosemyplate.gov.       The Food Groups    Fruits: Any fruit or 100% fruit juice counts as part of the Fruit Group. Fruits may be fresh, canned, frozen, or dried, and may be whole, cut-up, or pureed. Make half your plate fruits and vegetables.    Vegetables: Any vegetable or 100% vegetable juice counts as a member of the Vegetable Group. Vegetables may be fresh, frozen, canned, or dried. They can be served raw or cooked and may be whole, cut-up, or mashed. Make half your plate fruits and vegetables.     Grains: All foods made from grains are part of the Grains Group. These include wheat, rice, oats, cornmeal, and barley such as bread, pasta, oatmeal, cereal,  tortillas, and grits. Grains should be no more than a quarter of your plate. At least half of your grains should be whole grains.    Protein: This group includes meat, poultry, seafood, beans and peas, eggs, processed soy products (like tofu), nuts (including nut butters), and seeds. Make protein choices no more than a quarter of your plate. Meat and poultry choices should be lean or low fat.    Dairy: All fluid milk products and foods made from milk that contain calcium, like yogurt and cheese are part of the Dairy Group. (Foods that have little calcium, such as cream, butter, and cream cheese, are not part of the group.) Most dairy choices should be low-fat or fat-free.    Oils: These are fats that are liquid at room temperature. They include canola, corn, olive, soybean, and sunflower oil. Foods that are mainly oil include mayonnaise, certain salad dressings, and soft margarines. You should have only 5 to 7 teaspoons of oils a day. You probably already get this much from the food you eat.  Use [employer] to Help Build Your Meals  The [employer] can help you plan and track your meals and activity. You can look up individual foods to see or compare their nutritional value. You can get guidelines for what and how much you should eat. You can compare your food choices. And you can assess personal physical activities and see ways you can improve. Go to www.[employer].gov/supertracker/.    [phone] [employer]. [address] [place] [address], [place] [address]. All rights reserved. This information is not intended as a substitute for professional medical care. Always follow your healthcare professional's instructions.           Patient Education   Understanding Advance Care Planning  Advance care planning is the process of deciding ones own future medical care. It helps ensure that if you cant speak for yourself, your wishes can still be carried out. The plan is a series of legal documents that note a  persons wishes. The documents vary by state. Advance care planning may be done when a person has a serious illness that is expected to get worse. It may be done before major surgery. And it can help you and your family be prepared in case of a major illness or injury. Advance care planning helps with making decisions at these times.       A health care proxy is a person who acts as the voice of a patient when the patient cant speak for himself or herself. The name of this role varies by state. It may be called a Durable Medical Power of  or Durable Power of  for Healthcare. It may be called an agent, surrogate, or advocate. Or it may be called a representative or decision maker. It is an official duty that is identified by a legal document. The document also varies by state.    Why Is Advance Care Planning Important?  If a person communicates their healthcare wishes:    They will be given medical care that matches their values and goals.    Their family members will not be forced to make decisions in a crisis with no guidance.  Creating a Plan  Making an advance care plan is often done in 3 steps:    Thinking about ones wishes. To create an advance care plan, you should think about what kind of medical treatment you would want if you lose the ability to communicate. Are there any situations in which you would refuse or stop treatment? Are there therapies you would want or not want? And whom do you want to make decisions for you? There are many places to learn more about how to plan for your care. Ask your doctor or  for resources.    Picking a health care proxy. This means choosing a trusted person to speak for you only when you cant speak for yourself. When you cannot make medical decisions, your proxy makes sure the instructions in your advance care plan are followed. A proxy does not make decisions based on his or her own opinions. They must put aside those opinions and values if  needed, and carry out your wishes.    Filling out the legal documents. There are several kinds of legal documents for advance care planning. Each one tells health care providers your wishes. The documents may vary by state. They must be signed and may need to be witnessed or notarized. You can cancel or change them whenever you wish. Depending on your state, the documents may include a Healthcare Proxy form, Living Will, Durable Medical Power of , Advance Directive, or others.  The Familys Role  The best help a family can give is to support their loved ones wishes. Open and honest communication is vital. Family should express any concerns they have about the patients choices while the patient can still make decisions.    [id number] [employer]. [address] [place] [address], [place] [address]. All rights reserved. This information is not intended as a substitute for professional medical care. Always follow your healthcare professional's instructions.         Also, [facility] offers a free, downloadable health care directive that allows you to share your treatment choices and personal preferences if you cannot communicate your wishes. It also allows you to appoint another person (called a health care agent) to make health care decisions if you are unable to do so. You can download an advance directive by going here: [email or web address]     Patient Education   Personalized Prevention Plan  You are due for the preventive services outlined below.  Your care team is available to assist you in scheduling these services.  If you have already completed any of these items, please share that information with your care team to update in your medical record.  Health Maintenance   Topic Date Due   ? DIABETIC EYE EXAM  1945   ? HEPATITIS B VACCINES (1 of 3 - Risk 3-dose series) 04/14/1962   ? ZOSTER VACCINES (1 of 2) 04/14/1993   ? MEDICARE ANNUAL WELLNESS VISIT   04/14/2008   ? DXA SCAN  07/28/2010   ? DIABETIC FOOT EXAM  05/10/2019   ? BMP  02/14/2020   ? INFLUENZA VACCINE RULE BASED (1) 08/01/2020   ? LIPID  11/12/2020   ? MICROALBUMIN  11/12/2020   ? A1C  12/03/2020   ? FALL RISK ASSESSMENT  12/30/2020   ? ADVANCE CARE PLANNING  02/08/2023   ? TD 18+ HE  02/14/2029   ? PNEUMOCOCCAL IMMUNIZATION 65+ LOW/MEDIUM RISK  Completed

## 2021-06-18 NOTE — PROGRESS NOTES
Subjective: This patient comes in for evaluation she is followed by Dr. Hidalgo.    She is on metformin 500 mg 2 twice a day and glipizide 2.5 mg 1 a day also lisinopril    I reviewed our pharmacist's recommendations and evaluation as well as Dr. Hidalgo's notes.    Patient had an A1c of 13.1 on 2/8/2018 also positive microalbumin she is on lisinopril 10 mg a day blood pressure seems to be controlled    Patient's lipids did show LDL at 151.  I discussed with her risk-benefit regarding statin and the 14% reduction in CVA and MI.  She seemed to understand and elected to go on atorvastatin she will be on 40 mg a day.  Recheck in 8 weeks we will recheck A1c please see below    Her A1c did improve down to 9.3.  Her sugars are in the 150-250 range.    Denies any medication problems.    One issue she does have ongoing is some scalp dermatitis she had tried some ketoconazole shampoo or cream without help.  I did elect use some Synalar solution she can take it daily for about a week and then every other day and then back off of it seems to be effective it is mainly through the posterior scalp in the occipital area.    No additional concerns or issues  Tobacco status: She  reports that she has never smoked. She has never used smokeless tobacco.    Patient Active Problem List    Diagnosis Date Noted     Microalbuminuria 05/10/2018     Diabetes mellitus 02/08/2018     Hypertension 02/08/2018     Hyperlipidemia 02/08/2018     Diverticula of colon 02/08/2018     H/O colonoscopy 02/08/2018       Current Outpatient Prescriptions   Medication Sig Dispense Refill     lisinopril (PRINIVIL,ZESTRIL) 10 MG tablet Take 1 tablet (10 mg total) by mouth daily. 30 tablet 11     metFORMIN (GLUCOPHAGE-XR) 500 MG 24 hr tablet Take 2 tablets (1,000 mg total) by mouth 2 (two) times a day. 360 tablet 1     aspirin 81 mg chewable tablet Chew 81 mg daily.        atorvastatin (LIPITOR) 40 MG tablet Take 1 tablet (40 mg total) by mouth daily. 90 tablet 1      "CALCIUM CARBONATE/VITAMIN D3 (CALCIUM 500 + D, D3, ORAL) Take 1 tablet by mouth daily as needed.       CINNAMON BARK (CINNAMON ORAL) Take 1 capsule by mouth daily as needed.       fluocinolone (SYNALAR) 0.01 % external solution Rub into affected scalp 3 times a week 60 mL 2     glipiZIDE (GLUCOTROL XL) 5 MG 24 hr tablet Take 1 tablet (5 mg total) by mouth daily. With or without meals 90 tablet 3     senna (SENOKOT) 8.6 mg tablet Take 1 tablet by mouth daily as needed.       No current facility-administered medications for this visit.        ROS:   10 point review of systems negative other than as outlined above    Objective:    /72 (Patient Site: Left Arm, Patient Position: Sitting, Cuff Size: Adult Large)  Pulse 80  Temp 97.4  F (36.3  C) (Oral)   Resp 20  Ht 5' 5.5\" (1.664 m)  Wt 162 lb (73.5 kg)  SpO2 97% Comment: at rest with room air  BMI 26.55 kg/m2  Body mass index is 26.55 kg/(m^2).      General appearance no acute distress    Vital signs were stable she is afebrile    HEENT oropharynx was clear pupils react normally no scleral icterus    Lungs clear no rales or rhonchi heart regular rate at 80    O2 sat 97%    Extremities without edema pulses normal, sensation normal    Abdomen nontender.  No masses    Skin with patches of eczematous change in the occipital area  into the scalp.    A1c is outlined.  Improved from 13.1    Previous labs discussed with a positive microalbumin normal liver tests normal renal function , back in February    Results for orders placed or performed in visit on 05/10/18   Glycosylated Hemoglobin A1c   Result Value Ref Range    Hemoglobin A1c 9.3 (H) 3.5 - 6.0 %       Assessment:  1. Diabetes mellitus  Glycosylated Hemoglobin A1c   2. Hyperlipidemia  atorvastatin (LIPITOR) 40 MG tablet   3. Seborrheic dermatitis of scalp  fluocinolone (SYNALAR) 0.01 % external solution   4. Microalbuminuria     5. Hypertension       Diabetes mellitus improved, suboptimal.  Stay on " same metformin increase glipizide to 2.5 mg twice daily also.    Stay on lisinopril for microalbuminuria and hypertension    Start atorvastatin 40 mg 1 a day    Use Synalar solution for the scalp dermatitis    Plan: As outlined above recheck in 2 months check lipid AST ALT and recheck A1c    This transcription uses voice recognition software, which may contain typographical errors.

## 2021-06-19 NOTE — PROGRESS NOTES
Assessment: Follow up with Stephan to assess bg, medication administration and intake.  Forgot meter at home, bg are self reported.  Last visit increased glipizide XR to 5 mg daily,  also made recommendation after CDE visit, currently taking one 5 mg tablet of Glipizide XR daily in the AM    Current dm medications:  Metformin XR two 500 mg tablets two times a day  Glipizide XR one 5 mg tablet daily    Medication reconciliation indicates 100 % accuracy. No problems tolerating medications and now low bg reported.    A1c 7.5, recheck today, down from 9.5    Reported bg:  Fasting 130-160 mg/dl    States higher numbers in the AM are generally from eating french fries and/or onion rings mala bingo, tries to be careful with how much she eats.  Most of the time bg are 130-140 mg/dl, does not check every day.   Reviewed bg goals both AC and PC, foods effect on bg and need for portion control.    Nice job of eating more at meals, breakfast is still yogurt only, however eats fruit for Am snack, sandwich for lunch and dinner is either health frozen meal or canned soup we discussed at last visit.  Stephan does not cook and has no interest in starting this, happy with current meals and food choices. Eating more fresh fruit this summer but does not go over board with portions.      No change in dm medications given drop in A1c and reported bg only.  Follow up with Dr. Lee next month, instructed to bring in meter.    Overall doing well with current dm mgmt.       Plan:  Follow up with  MD 8.2018, CDE 11.2018    Subjective and Objective:      Stephan Clark is referred by   for Diabetes Education.     Lab Results   Component Value Date    HGBA1C 7.5 (H) 07/19/2018         Current diabetes medications:    Metformin XR two 500 mg tablets two times a day  Glipizide XR one 5 mg tablet daily      Goals       nutrition            1.  Eat small lunch and dinner each day  2.  Add variety at meals  3. Eat more than apple for  lunch            Follow up:   CDE (certified diabetic educator)      Education:     Monitoring   Meter (per above goals): Competent  Monitoring: Discussed  BG goals: Discussed    Nutrition Management  Nutrition Management: Assessed and Discussed  Weight: Assessed  Portions/Balance: Discussed  Carb ID/Count: Discussed  Heart Healthy Fats: Discussed  Menu Planning: Assessed  Dining Out: Discussed  Physical Activity: Discussed  Medications: Discussed and Competent  Orals: Assessed, Discussed and Competent    Acute Complications: Prevent, Detect, Treat:  Hypoglycemia: Assessed and Discussed  Hyperglycemia: Assessed and Discussed  Sick Days: Not addressed    Chronic Complications  Foot Care:Discussed  Skin Care: Discussed  Eye: Discussed and Competent  ABC: Assessed and Discussed  Teeth:Discussed and refused dental care  Goal Setting and Problem Solving: Assessed  Barriers: Assessed  Psychosocial Adjustments: Assessed      Time spent with the patient: 60 minutes for diabetes education and counseling.   Previous Education: yes  Visit Type:MNT  Hours Remaining: DSMT 10, MNT 1      Edith Bear  7/19/2018

## 2021-06-19 NOTE — LETTER
Letter by Lukas Lee MD at      Author: Lukas Lee MD Service: -- Author Type: --    Filed:  Encounter Date: 5/20/2019 Status: (Other)         Stephan Clark  [address]  [place] [address]       May 20, 2019       Dear Ms. Clark,    Below are the results from your recent visit:    Resulted Orders   Glycosylated Hemoglobin A1c   Result Value Ref Range    Hemoglobin A1c 9.5 (H) 3.5 - 6.0 %   HM1 (CBC with Diff)   Result Value Ref Range    WBC 7.0 4.0 - 11.0 thou/uL    RBC 4.84 3.80 - 5.40 mill/uL    Hemoglobin 14.8 12.0 - 16.0 g/dL    Hematocrit 42.9 35.0 - 47.0 %    MCV 89 80 - 100 fL    MCH 30.5 27.0 - 34.0 pg    MCHC 34.4 32.0 - 36.0 g/dL    RDW 11.3 11.0 - 14.5 %    Platelets 171 140 - 440 thou/uL    MPV 7.7 7.0 - 10.0 fL    Neutrophils % 54 50 - 70 %    Lymphocytes % 36 20 - 40 %    Monocytes % 7 2 - 10 %    Eosinophils % 2 0 - 6 %    Basophils % 1 0 - 2 %    Neutrophils Absolute 3.8 2.0 - 7.7 thou/uL    Lymphocytes Absolute 2.5 0.8 - 4.4 thou/uL    Monocytes Absolute 0.5 0.0 - 0.9 thou/uL    Eosinophils Absolute 0.1 0.0 - 0.4 thou/uL    Basophils Absolute 0.0 0.0 - 0.2 thou/uL     Your results indicate that your A1c has worsened, increasing to 9.5.     It is recommended that you restart the Metformin and start a new medication called Januvia. This has been sent to your [facility] delivery system.    Take the generic Januvia once a day in addition to the Metformin and the glipizide.   Return for a recheck in 6 weeks. Call or stop by if there is any ongoing nausea.        Please call with questions or contact us using [employer].        Sincerely,        Electronically signed by Lukas Lee MD

## 2021-06-20 NOTE — PROGRESS NOTES
Subjective: Patient comes in for follow-up this 73-year-old female saw me back in May.  States that she wants to continue to follow with me.    Labs in May showed A1c 9.3 metformin on 500 mg 2 twice a day glipizide ER 5 mg 1 a day para graph sugars are in the 130-185 range.    A1c had come down to 9.3 back in May was rechecked today again.    Also she was started on atorvastatin at that time denies any side effects lipid AST ALT pending from today    Patient's blood pressure is at the upper end of normal will monitor that she is on lisinopril she does have microalbuminuria.    No additional concerns or issues    Tobacco status: She  reports that she has never smoked. She has never used smokeless tobacco.    Patient Active Problem List    Diagnosis Date Noted     Microalbuminuria 05/10/2018     Diabetes mellitus (H) 02/08/2018     Hypertension 02/08/2018     Hyperlipidemia 02/08/2018     Diverticula of colon 02/08/2018     H/O colonoscopy 02/08/2018       Current Outpatient Prescriptions   Medication Sig Dispense Refill     atorvastatin (LIPITOR) 40 MG tablet Take 1 tablet (40 mg total) by mouth daily. 90 tablet 1     CALCIUM CARBONATE/VITAMIN D3 (CALCIUM 500 + D, D3, ORAL) Take 1 tablet by mouth daily as needed.       CINNAMON BARK (CINNAMON ORAL) Take 1 capsule by mouth daily as needed.       fluocinolone (SYNALAR) 0.01 % external solution Rub into affected scalp 3 times a week 60 mL 2     glipiZIDE (GLUCOTROL XL) 5 MG 24 hr tablet Take 1 tablet (5 mg total) by mouth daily. With or without meals 90 tablet 3     lisinopril (PRINIVIL,ZESTRIL) 10 MG tablet Take 1 tablet (10 mg total) by mouth daily. 30 tablet 11     metFORMIN (GLUCOPHAGE-XR) 500 MG 24 hr tablet Take 2 tablets (1,000 mg total) by mouth 2 (two) times a day. 360 tablet 1     aspirin 81 mg chewable tablet Chew 81 mg daily.        senna (SENOKOT) 8.6 mg tablet Take 1 tablet by mouth daily as needed.       No current facility-administered medications for  this visit.        ROS:   Review of systems negative other than as outlined above    Objective:    /60 (Patient Site: Right Arm, Patient Position: Sitting, Cuff Size: Adult Regular)  Pulse 88  Resp 22  Wt 162 lb (73.5 kg)  BMI 26.55 kg/m2  Body mass index is 26.55 kg/(m^2).      General appearance no acute distress    Vital signs stable as outlined    Neck negative no adenopathy oropharynx clear eyes exam normally    Lungs clear no rales rhonchi heart regular S1-S2    Extremities without edema    Lab work showed a nice drop in the lipids with normal AST ALT    Hemoglobin A1c looked good at 7.5    Results for orders placed or performed in visit on 08/21/18   Glycosylated Hemoglobin A1c   Result Value Ref Range    Hemoglobin A1c 7.5 (H) 3.5 - 6.0 %   Lipid [place] FASTING   Result Value Ref Range    Cholesterol 117 <=199 mg/dL    Triglycerides 118 <=149 mg/dL    HDL Cholesterol 38 (L) >=50 mg/dL    LDL Calculated 55 <=129 mg/dL    Patient Fasting > 8hrs? Yes    AST (SGOT)   Result Value Ref Range    AST 12 0 - 40 U/L   ALT (SGPT)   Result Value Ref Range    ALT <9 0 - 45 U/L       Assessment:  1. Type 2 diabetes mellitus with microalbuminuria, without long-term current use of insulin  Glycosylated Hemoglobin A1c   2. Essential hypertension     3. Microalbuminuria     4. Hyperlipidemia  Lipid [place] FASTING    AST (SGOT)    ALT (SGPT)     Continue same meds recheck in 3-4 months    Plan: As outlined above    This transcription uses voice recognition software, which may contain typographical errors.

## 2021-06-20 NOTE — LETTER
Letter by Gladys Marquez MD at      Author: Gladys Marquez MD Service: -- Author Type: --    Filed:  Encounter Date: 9/8/2020 Status: (Other)         Stephan Clark  [address]  [place] [address]             September 8, 2020         Dear Ms. Clark,    Below are the results from your recent visit:    Resulted Orders   Comprehensive Metabolic Panel   Result Value Ref Range    Sodium 139 136 - 145 mmol/L    Potassium 4.4 3.5 - 5.0 mmol/L    Chloride 104 98 - 107 mmol/L    CO2 26 22 - 31 mmol/L    Anion Gap, Calculation 9 5 - 18 mmol/L    Glucose 238 (H) 70 - 125 mg/dL    BUN 11 8 - 28 mg/dL    Creatinine 0.88 0.60 - 1.10 mg/dL    GFR MDRD Af Amer >60 >60 mL/min/1.73m2    GFR MDRD Non Af Amer >60 >60 mL/min/1.73m2    Bilirubin, Total 0.3 0.0 - 1.0 mg/dL    Calcium 9.4 8.5 - 10.5 mg/dL    Protein, Total 7.3 6.0 - 8.0 g/dL    Albumin 4.1 3.5 - 5.0 g/dL    Alkaline Phosphatase 90 45 - 120 U/L    AST 11 0 - 40 U/L    ALT <9 0 - 45 U/L    Narrative    Fasting Glucose reference range is 70-99 mg/dL per  American Diabetes Association (ADA) guidelines.   Glycosylated Hemoglobin A1c   Result Value Ref Range    Hemoglobin A1c 7.0 (H) <=5.6 %      Comment:      Normal <5.7% Prediabete 5.7-6.4% Diabletes 6.5% or higher - adopted from ADA consensus guidelines   Lipid [place] FASTING   Result Value Ref Range    Cholesterol 148 <=199 mg/dL    Triglycerides 297 (H) <=149 mg/dL    HDL Cholesterol 42 (L) >=50 mg/dL    LDL Calculated 47 <=129 mg/dL    Patient Fasting > 8hrs? Unknown        Interestingly, your blood sugar was elevated (at 238) on your bloodwork, but your A1c was pretty stable.  Keep an eye on those sugars;  Recheck in 3-4 months.     Please call with questions or contact us using [email or web address].    Sincerely,        Electronically signed by Gladys Marquez MD

## 2021-06-21 ENCOUNTER — COMMUNICATION - HEALTHEAST (OUTPATIENT)
Dept: FAMILY MEDICINE | Facility: CLINIC | Age: 76
End: 2021-06-21

## 2021-06-21 DIAGNOSIS — E11.8 TYPE 2 DIABETES MELLITUS WITH COMPLICATION, WITHOUT LONG-TERM CURRENT USE OF INSULIN (H): ICD-10-CM

## 2021-06-21 NOTE — PROGRESS NOTES
Subjective: Patient comes in for evaluation a 73-year-old female has diabetes hypertension hyperlipidemia.  Patient's been on metformin 500 mg 2 twice a day glipizide 5 mg 1 a day.  Last A1c is 7.5.  Her sugars are higher.  They are in the higher 100s to low 200s now.  A1c reflected this today with an A1c up to 9.3.    We will increase her glipizide to 2 tablets twice a day stay on the metformin 2 tablets twice a day as well recheck in 3 months    A flu shot was given today.    Regarding lipids she is on atorvastatin 40 mg a day and for blood pressure lisinopril 10 mg a day    Patient does have microalbumin urea.    No additional concerns or issues.    Previous labs showed LDL of 55 back in August.    Tobacco status: She  reports that  has never smoked. she has never used smokeless tobacco.    Patient Active Problem List    Diagnosis Date Noted     Microalbuminuria 05/10/2018     Diabetes mellitus (H) 02/08/2018     Hypertension 02/08/2018     Hyperlipidemia 02/08/2018     Diverticula of colon 02/08/2018     H/O colonoscopy 02/08/2018       Current Outpatient Medications   Medication Sig Dispense Refill     atorvastatin (LIPITOR) 40 MG tablet Take 1 tablet (40 mg total) by mouth daily. 90 tablet 1     CALCIUM CARBONATE/VITAMIN D3 (CALCIUM 500 + D, D3, ORAL) Take 1 tablet by mouth daily as needed.       CINNAMON BARK (CINNAMON ORAL) Take 1 capsule by mouth daily as needed.       glipiZIDE (GLUCOTROL XL) 5 MG 24 hr tablet 2 po two times a day with meals. 360 tablet 1     lisinopril (PRINIVIL,ZESTRIL) 10 MG tablet Take 1 tablet (10 mg total) by mouth daily. 30 tablet 11     metFORMIN (GLUCOPHAGE-XR) 500 MG 24 hr tablet Take 2 tablets (1,000 mg total) by mouth 2 (two) times a day. 360 tablet 1     senna (SENOKOT) 8.6 mg tablet Take 1 tablet by mouth daily as needed.       aspirin 81 mg chewable tablet Chew 81 mg daily.        fluocinolone (SYNALAR) 0.01 % external solution Rub into affected scalp 3 times a week 60 mL 2      No current facility-administered medications for this visit.        ROS:   10 point review of systems negative other than as outlined above    Objective:    /56 (Patient Site: Left Arm, Patient Position: Sitting, Cuff Size: Adult Regular)   Pulse 80   Temp 96.9  F (36.1  C) (Oral)   Resp 12   Wt 165 lb (74.8 kg)   BMI 27.04 kg/m    Body mass index is 27.04 kg/m .      General appearance no acute distress    Vital signs were stable    Neck negative no adenopathy    Lungs are clear no rales rhonchi heart was regular S1-S2    Eye exam normal, normal extraocular movements    Abdomen soft nontender no guarding rebound    Extremities without edema skin was normal.  No numbness through the feet    Results for orders placed or performed in visit on 11/15/18   Glycosylated Hemoglobin A1c   Result Value Ref Range    Hemoglobin A1c 9.3 (H) 3.5 - 6.0 %       Assessment:  1. Type 2 diabetes mellitus with microalbuminuria, without long-term current use of insulin (H)  Glycosylated Hemoglobin A1c   2. Essential hypertension     3. Microalbuminuria     4. Hyperlipidemia, unspecified hyperlipidemia type     5. Type 2 diabetes mellitus with complication, without long-term current use of insulin (H)  glipiZIDE (GLUCOTROL XL) 5 MG 24 hr tablet   6. Flu vaccine need  Influenza High Dose, Seasonal     Diabetes mellitus suboptimal control increase glipizide to 2 tablets twice a day stay on metformin 500 mg 2 twice a day recheck in 3 months, check A1c    microalbuminuria and hypertension controlled with lisinopril    Hyperlipidemia controlled on atorvastatin    Immunization update with flu shot    Plan: As outlined above    This transcription uses voice recognition software, which may contain typographical errors.

## 2021-06-22 RX ORDER — GLIPIZIDE 5 MG/1
TABLET, FILM COATED, EXTENDED RELEASE ORAL
Qty: 180 TABLET | Refills: 0 | Status: SHIPPED | OUTPATIENT
Start: 2021-06-22

## 2021-06-22 RX ORDER — PIOGLITAZONEHYDROCHLORIDE 45 MG/1
TABLET ORAL
Qty: 30 TABLET | Refills: 0 | Status: SHIPPED | OUTPATIENT
Start: 2021-06-22

## 2021-06-24 NOTE — TELEPHONE ENCOUNTER
----- Message from Lukas Lee MD sent at 2/14/2019  7:25 PM CST -----  Please contact this patient, let her know that the kidney function and liver function were normal also her A1c has come down to 8.6 which is improved.    No change in medicine continue on the same medicines for her diabetes and cholesterol and blood pressure.    Continue to work on her diet to decrease carbohydrates and increase physical activity.    Recheck in 3 months

## 2021-06-24 NOTE — TELEPHONE ENCOUNTER
Called patient and informed her of the lab results and the recommendations. No further questions at this time.

## 2021-06-24 NOTE — PROGRESS NOTES
Subjective: This patient comes in for evaluation patient has diabetes.  Patient has been on metformin 500 mg 2 twice a day glipizide 5 mg 2 twice a day.  Last A1c in November was 9.3.  It is now down to 8.6.    Patient continues on atorvastatin for lipids last LDL was 55 in August.    Also takes lisinopril 10 mg daily blood pressure on recheck was    Denies additional problems    She is due for a tetanus update and this was given.    Labs today included A1c and CMP.    She denies any chest pain shortness of breath denies any hypoglycemic symptoms or episodes      Tobacco status: She  reports that  has never smoked. she has never used smokeless tobacco.    Patient Active Problem List    Diagnosis Date Noted     Microalbuminuria 05/10/2018     Diabetes mellitus (H) 02/08/2018     Hypertension 02/08/2018     Hyperlipidemia 02/08/2018     Diverticula of colon 02/08/2018     H/O colonoscopy 02/08/2018       Current Outpatient Medications   Medication Sig Dispense Refill     aspirin 81 mg chewable tablet Chew 81 mg daily.        atorvastatin (LIPITOR) 40 MG tablet Take 1 tablet (40 mg total) by mouth daily. 90 tablet 1     CALCIUM CARBONATE/VITAMIN D3 (CALCIUM 500 + D, D3, ORAL) Take 1 tablet by mouth daily as needed.       lisinopril (PRINIVIL,ZESTRIL) 10 MG tablet Take 1 tablet (10 mg total) by mouth daily. 30 tablet 11     metFORMIN (GLUCOPHAGE-XR) 500 MG 24 hr tablet TAKE 2 TABLETS TWICE DAILY. 360 tablet 1     CINNAMON BARK (CINNAMON ORAL) Take 1 capsule by mouth daily as needed.       fluocinolone (SYNALAR) 0.01 % external solution Rub into affected scalp 3 times a week 60 mL 2     glipiZIDE (GLUCOTROL XL) 5 MG 24 hr tablet 2 po two times a day with meals. 360 tablet 1     senna (SENOKOT) 8.6 mg tablet Take 1 tablet by mouth daily as needed.       No current facility-administered medications for this visit.        ROS: 10 point review of systems negative other than as outlined above    Objective:    /74  (Patient Site: Right Arm, Patient Position: Sitting, Cuff Size: Adult Large)   Pulse 80   Wt 167 lb (75.8 kg)   BMI 27.37 kg/m    Body mass index is 27.37 kg/m .      General appearance no acute distress    HEENT: Neck is supple no adenopathy oropharynx is clear pupils react normally extraocular movements are full    Lungs are clear no rales or rhonchi heart was regular S1-S2.  Her graph skin was normal no rashes    Extremities without edema normal sensation normal pulses    Abdomen was soft nontender no guarding or rebound.    Labs as outlined below with CMP essentially normal we will just monitor her potassium    A1c is down to 8.6    Results for orders placed or performed in visit on 02/14/19   Comprehensive Metabolic Panel   Result Value Ref Range    Sodium 142 136 - 145 mmol/L    Potassium 3.4 (L) 3.5 - 5.0 mmol/L    Chloride 104 98 - 107 mmol/L    CO2 28 22 - 31 mmol/L    Anion Gap, Calculation 10 5 - 18 mmol/L    Glucose 107 70 - 125 mg/dL    BUN 7 (L) 8 - 28 mg/dL    Creatinine 0.75 0.60 - 1.10 mg/dL    GFR MDRD Af Amer >60 >60 mL/min/1.73m2    GFR MDRD Non Af Amer >60 >60 mL/min/1.73m2    Bilirubin, Total 0.7 0.0 - 1.0 mg/dL    Calcium 9.1 8.5 - 10.5 mg/dL    Protein, Total 6.9 6.0 - 8.0 g/dL    Albumin 4.1 3.5 - 5.0 g/dL    Alkaline Phosphatase 66 45 - 120 U/L    AST 14 0 - 40 U/L    ALT 9 0 - 45 U/L   Glycosylated Hemoglobin A1c   Result Value Ref Range    Hemoglobin A1c 8.6 (H) 3.5 - 6.0 %       Assessment:  1. Type 2 diabetes mellitus with microalbuminuria, without long-term current use of insulin (H)  Comprehensive Metabolic Panel    Glycosylated Hemoglobin A1c   2. Essential hypertension  Comprehensive Metabolic Panel   3. Hyperlipidemia, unspecified hyperlipidemia type  Comprehensive Metabolic Panel   4. Microalbuminuria     5. Immunization counseling  Td, Preservative Free (green label)     Diabetes mellitus improving continue same meds work on diet and exercise recheck in 3  months    Hypertension controlled continue lisinopril she does have microalbumin urea as well.    Hyperlipidemia controlled with last LDL 55 back in August stay on atorvastatin.    Immunization counseling, TD shot given today        Plan: As discussed above recheck 3 months    This transcription uses voice recognition software, which may contain typographical errors.

## 2021-06-26 NOTE — TELEPHONE ENCOUNTER
RN cannot approve Refill Request    RN can NOT refill this medication PCP messaged that patient is overdue for Labs. Last office visit: 2/17/2020 Lukas Lee MD Last Physical: Visit date not found Last MTM visit: Visit date not found Last visit same specialty: 2/17/2020 Lukas Lee MD.  Next visit within 3 mo: Visit date not found  Next physical within 3 mo: Visit date not found      Stacey Cantu, [facility] Triage/Med Refill 6/21/2021    Requested Prescriptions   Pending Prescriptions Disp Refills     pioglitazone (ACTOS) 45 MG tablet [Pharmacy Med Name: PIOGLITAZONE HCL 45 MG Tablet] 30 tablet 0     Sig: TAKE 1 TABLET EVERY DAY (NEED MD APPOINTMENT)       Pioglitazone Protocol Failed - 6/21/2021  1:36 PM        Failed - A1C in last 6 months     Hemoglobin A1c   Date Value Ref Range Status   08/25/2020 7.0 (H) <=5.6 % Final     Comment:     Normal <5.7% Prediabete 5.7-6.4% Diabletes 6.5% or higher - adopted from ADA consensus guidelines               Failed - Microalbumin in last year     Microalbumin, Random Urine   Date Value Ref Range Status   11/12/2019 4.82 (H) 0.00 - 1.99 mg/dL Final                  Passed - Blood pressure in last 12 months     BP Readings from Last 1 Encounters:   08/25/20 140/69             Passed - LFT or AST or ALT in last 12 months     Albumin   Date Value Ref Range Status   08/25/2020 4.1 3.5 - 5.0 g/dL Final     Bilirubin, Total   Date Value Ref Range Status   08/25/2020 0.3 0.0 - 1.0 mg/dL Final     Alkaline Phosphatase   Date Value Ref Range Status   08/25/2020 90 45 - 120 U/L Final     AST   Date Value Ref Range Status   08/25/2020 11 0 - 40 U/L Final     ALT   Date Value Ref Range Status   08/25/2020 <9 0 - 45 U/L Final     Protein, Total   Date Value Ref Range Status   08/25/2020 7.3 6.0 - 8.0 g/dL Final                Passed - Visit with PCP or prescribing provider visit in last 6 months      Last office visit with prescriber/PCP: Visit date not found OR  same dept: Visit date not found OR same specialty: 2/17/2020 Lukas Brown MD Last physical: Visit date not found Last MTM visit: Visit date not found         Next appt within 3 mo: Visit date not found  Next physical within 3 mo: Visit date not found  Prescriber OR PCP: Lukas Brown MD  Last diagnosis associated with med order: 1. Type 2 diabetes mellitus with complication, without long-term current use of insulin (H)  - pioglitazone (ACTOS) 45 MG tablet [Pharmacy Med Name: PIOGLITAZONE HCL 45 MG Tablet]; TAKE 1 TABLET EVERY DAY (NEED MD APPOINTMENT)  Dispense: 30 tablet; Refill: 0  - glipiZIDE (GLUCOTROL XL) 5 MG 24 hr tablet [Pharmacy Med Name: GLIPIZIDE ER 5 MG Tablet Extended Release 24 Hour]; TAKE 3 TABLETS TWICE DAILY WITH A MEAL (DUE FOR FOLLOW UP WITH DR BROWN AT THE [facility])  Dispense: 180 tablet; Refill: 0     If protocol passes may refill for 12 months if within 3 months of last provider visit (or a total of 15 months).           Passed - Serum creatinine in last year     Creatinine   Date Value Ref Range Status   08/25/2020 0.88 0.60 - 1.10 mg/dL Final                glipiZIDE (GLUCOTROL XL) 5 MG 24 hr tablet [Pharmacy Med Name: GLIPIZIDE ER 5 MG Tablet Extended Release 24 Hour] 180 tablet 0     Sig: TAKE 3 TABLETS TWICE DAILY WITH A MEAL (DUE FOR FOLLOW UP WITH DR BROWN AT THE [facility])       Oral Hypoglycemics Refill Protocol Failed - 6/21/2021  1:36 PM        Failed - A1C in last 6 months     Hemoglobin A1c   Date Value Ref Range Status   08/25/2020 7.0 (H) <=5.6 % Final     Comment:     Normal <5.7% Prediabete 5.7-6.4% Diabletes 6.5% or higher - adopted from ADA consensus guidelines               Failed - Microalbumin in last year      Microalbumin, Random Urine   Date Value Ref Range Status   11/12/2019 4.82 (H) 0.00 - 1.99 mg/dL Final                  Passed - Visit with PCP or prescribing provider visit in last 6 months       Last office visit with prescriber/PCP: Visit  date not found OR same dept: Visit date not found OR same specialty: 2/17/2020 Lukas Brown MD Last physical: Visit date not found Last MTM visit: Visit date not found         Next appt within 3 mo: Visit date not found  Next physical within 3 mo: Visit date not found  Prescriber OR PCP: Lukas Brown MD  Last diagnosis associated with med order: 1. Type 2 diabetes mellitus with complication, without long-term current use of insulin (H)  - pioglitazone (ACTOS) 45 MG tablet [Pharmacy Med Name: PIOGLITAZONE HCL 45 MG Tablet]; TAKE 1 TABLET EVERY DAY (NEED MD APPOINTMENT)  Dispense: 30 tablet; Refill: 0  - glipiZIDE (GLUCOTROL XL) 5 MG 24 hr tablet [Pharmacy Med Name: GLIPIZIDE ER 5 MG Tablet Extended Release 24 Hour]; TAKE 3 TABLETS TWICE DAILY WITH A MEAL (DUE FOR FOLLOW UP WITH DR BROWN AT THE [facility])  Dispense: 180 tablet; Refill: 0     If protocol passes may refill for 12 months if within 3 months of last provider visit (or a total of 15 months).           Passed - Blood pressure in last year     BP Readings from Last 1 Encounters:   08/25/20 140/69             Passed - Serum creatinine in last year     Creatinine   Date Value Ref Range Status   08/25/2020 0.88 0.60 - 1.10 mg/dL Final

## 2021-06-26 NOTE — TELEPHONE ENCOUNTER
Spoke with pt @ [phone], per pt, pt is not able to schedule appt with Dr. Lee right now due to pt insurance. Pt have [employer] medic[employer]vantage, [facility] no longer accept [employer]age. Advise pt to contact insurance to figure out insurance.

## 2021-06-29 ENCOUNTER — COMMUNICATION - HEALTHEAST (OUTPATIENT)
Dept: FAMILY MEDICINE | Facility: CLINIC | Age: 76
End: 2021-06-29

## 2021-06-29 DIAGNOSIS — I10 ESSENTIAL HYPERTENSION: ICD-10-CM

## 2021-06-29 DIAGNOSIS — R80.9 MICROALBUMINURIA: ICD-10-CM

## 2021-06-29 RX ORDER — LISINOPRIL 20 MG/1
TABLET ORAL
Qty: 90 TABLET | Refills: 0 | Status: SHIPPED | OUTPATIENT
Start: 2021-06-29

## 2021-07-04 NOTE — TELEPHONE ENCOUNTER
Telephone Encounter by Milly Joy RN at 6/29/2021 11:00 PM     Author: Milly Joy RN Service: -- Author Type: Registered Nurse    Filed: 6/29/2021 11:00 PM Encounter Date: 6/29/2021 Status: Signed    : Milly Joy RN (Registered Nurse)       Refill Approved    Rx renewed per Medication Renewal Policy. Medication was last renewed on 2/23/21.    Milly Joy, [facility] Connection Triage/Med Refill 6/29/2021     Requested Prescriptions   Pending Prescriptions Disp Refills   ? lisinopriL (PRINIVIL,ZESTRIL) 20 MG tablet [Pharmacy Med Name: LISINOPRIL 20 MG Tablet] 90 tablet 1     Sig: TAKE 1 TABLET EVERY DAY       Ace Inhibitors Refill Protocol Passed - 6/29/2021  9:22 PM        Passed - PCP or prescribing provider visit in past 12 months       Last office visit with prescriber/PCP: 2/17/2020 Lukas Lee MD OR same dept: Visit date not found OR same specialty: 2/17/2020 Lukas Lee MD  Last physical: Visit date not found Last MTM visit: Visit date not found   Next visit within 3 mo: Visit date not found  Next physical within 3 mo: Visit date not found  Prescriber OR PCP: Lukas Lee MD  Last diagnosis associated with med order: 1. Essential hypertension  - lisinopriL (PRINIVIL,ZESTRIL) 20 MG tablet [Pharmacy Med Name: LISINOPRIL 20 MG Tablet]; TAKE 1 TABLET EVERY DAY  Dispense: 90 tablet; Refill: 1    2. Microalbuminuria  - lisinopriL (PRINIVIL,ZESTRIL) 20 MG tablet [Pharmacy Med Name: LISINOPRIL 20 MG Tablet]; TAKE 1 TABLET EVERY DAY  Dispense: 90 tablet; Refill: 1    If protocol passes may refill for 12 months if within 3 months of last provider visit (or a total of 15 months).             Passed - Serum Potassium in past 12 months     Lab Results   Component Value Date    Potassium 4.4 08/25/2020             Passed - Blood pressure filed in past 12 months     BP Readings from Last 1 Encounters:   08/25/20 140/69             Passed - Serum Creatinine in past 12 months     Creatinine    Date Value Ref Range Status   08/25/2020 0.88 0.60 - 1.10 mg/dL Final

## 2021-07-08 NOTE — TELEPHONE ENCOUNTER
Patient called stating she just saw PCP 05/28/21 and doesn't need a CPE at this time. She stated please stop calling her and she will call back to schedule a CPE the end of the year.   Also, there was a letter sent out to her today which includes her children to see Dr. Warren but they see a different doctor.   Please call patient back to discuss the letter.     Please call this pharmacy.    Yes I do want the patient is to take 3 tablets 2 times a day.    I sent a prescription last week for this